# Patient Record
Sex: MALE | Race: WHITE
[De-identification: names, ages, dates, MRNs, and addresses within clinical notes are randomized per-mention and may not be internally consistent; named-entity substitution may affect disease eponyms.]

---
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
## 2019-01-29 ENCOUNTER — HOSPITAL ENCOUNTER (INPATIENT)
Dept: HOSPITAL 62 - ER | Age: 19
LOS: 3 days | Discharge: ANOTHER HOSPITAL | DRG: 871 | End: 2019-02-01
Attending: INTERNAL MEDICINE | Admitting: INTERNAL MEDICINE
Payer: MEDICAID

## 2019-01-29 DIAGNOSIS — F32.9: ICD-10-CM

## 2019-01-29 DIAGNOSIS — B95.8: ICD-10-CM

## 2019-01-29 DIAGNOSIS — A41.9: Primary | ICD-10-CM

## 2019-01-29 DIAGNOSIS — E87.1: ICD-10-CM

## 2019-01-29 DIAGNOSIS — I26.90: ICD-10-CM

## 2019-01-29 DIAGNOSIS — F15.10: ICD-10-CM

## 2019-01-29 DIAGNOSIS — I33.0: ICD-10-CM

## 2019-01-29 DIAGNOSIS — F11.10: ICD-10-CM

## 2019-01-29 DIAGNOSIS — R06.82: ICD-10-CM

## 2019-01-29 DIAGNOSIS — J18.9: ICD-10-CM

## 2019-01-29 DIAGNOSIS — E83.51: ICD-10-CM

## 2019-01-29 DIAGNOSIS — N17.9: ICD-10-CM

## 2019-01-29 LAB
ADD MANUAL DIFF: NO
ALBUMIN SERPL-MCNC: 3.2 G/DL (ref 3.5–5)
ALP SERPL-CCNC: 114 U/L (ref 38–126)
ALT SERPL-CCNC: 15 U/L (ref 21–72)
ANION GAP SERPL CALC-SCNC: 16 MMOL/L (ref 5–19)
APPEARANCE UR: (no result)
APTT PPP: (no result) S
AST SERPL-CCNC: 32 U/L (ref 17–59)
BARBITURATES UR QL SCN: NEGATIVE
BASE EXCESS BLDV CALC-SCNC: 3.9 MMOL/L
BASOPHILS # BLD AUTO: 0.1 10^3/UL (ref 0–0.2)
BASOPHILS NFR BLD AUTO: 0.5 % (ref 0–2)
BILIRUB DIRECT SERPL-MCNC: 0.5 MG/DL (ref 0–0.4)
BILIRUB SERPL-MCNC: 0.7 MG/DL (ref 0.2–1.3)
BILIRUB UR QL STRIP: NEGATIVE
BUN SERPL-MCNC: 57 MG/DL (ref 7–20)
CALCIUM: 8.3 MG/DL (ref 8.4–10.2)
CHLORIDE SERPL-SCNC: 86 MMOL/L (ref 98–107)
CK MB SERPL-MCNC: 1.66 NG/ML (ref ?–4.55)
CO2 SERPL-SCNC: 29 MMOL/L (ref 22–30)
EOSINOPHIL # BLD AUTO: 0 10^3/UL (ref 0–0.6)
EOSINOPHIL NFR BLD AUTO: 0.3 % (ref 0–6)
ERYTHROCYTE [DISTWIDTH] IN BLOOD BY AUTOMATED COUNT: 15.8 % (ref 11.5–14)
GLUCOSE SERPL-MCNC: 134 MG/DL (ref 75–110)
GLUCOSE UR STRIP-MCNC: NEGATIVE MG/DL
HCO3 BLDV-SCNC: 28.7 MMOL/L (ref 20–32)
HCT VFR BLD CALC: 37.1 % (ref 37.9–51)
HGB BLD-MCNC: 12.8 G/DL (ref 13.5–17)
INR PPP: 1.07
KETONES UR STRIP-MCNC: NEGATIVE MG/DL
LYMPHOCYTES # BLD AUTO: 1.1 10^3/UL (ref 0.5–4.7)
LYMPHOCYTES NFR BLD AUTO: 6.8 % (ref 13–45)
MCH RBC QN AUTO: 29.6 PG (ref 27–33.4)
MCHC RBC AUTO-ENTMCNC: 34.5 G/DL (ref 32–36)
MCV RBC AUTO: 86 FL (ref 80–97)
METHADONE UR QL SCN: NEGATIVE
MONOCYTES # BLD AUTO: 0.8 10^3/UL (ref 0.1–1.4)
MONOCYTES NFR BLD AUTO: 5.2 % (ref 3–13)
NEUTROPHILS # BLD AUTO: 13.9 10^3/UL (ref 1.7–8.2)
NEUTS SEG NFR BLD AUTO: 87.2 % (ref 42–78)
NITRITE UR QL STRIP: NEGATIVE
PCO2 BLDV: 44.3 MMHG (ref 35–63)
PCP UR QL SCN: NEGATIVE
PH BLDV: 7.43 [PH] (ref 7.3–7.42)
PH UR STRIP: 5 [PH] (ref 5–9)
PLATELET # BLD: 168 10^3/UL (ref 150–450)
POTASSIUM SERPL-SCNC: 3.9 MMOL/L (ref 3.6–5)
PROT SERPL-MCNC: 6.8 G/DL (ref 6.3–8.2)
PROT UR STRIP-MCNC: 30 MG/DL
PROTHROMBIN TIME: 14.5 SEC (ref 11.4–15.4)
RBC # BLD AUTO: 4.33 10^6/UL (ref 4.35–5.55)
SODIUM SERPL-SCNC: 131.1 MMOL/L (ref 137–145)
SP GR UR STRIP: 1.02
TOTAL CELLS COUNTED % (AUTO): 100 %
TROPONIN I SERPL-MCNC: < 0.012 NG/ML
URINE BENZODIAZEPINES SCREEN: NEGATIVE
URINE COCAINE SCREEN: NEGATIVE
URINE MARIJUANA (THC) SCREEN: NEGATIVE
UROBILINOGEN UR-MCNC: 4 MG/DL (ref ?–2)
WBC # BLD AUTO: 15.9 10^3/UL (ref 4–10.5)

## 2019-01-29 PROCEDURE — 93005 ELECTROCARDIOGRAM TRACING: CPT

## 2019-01-29 PROCEDURE — 71045 X-RAY EXAM CHEST 1 VIEW: CPT

## 2019-01-29 PROCEDURE — 83605 ASSAY OF LACTIC ACID: CPT

## 2019-01-29 PROCEDURE — 86701 HIV-1ANTIBODY: CPT

## 2019-01-29 PROCEDURE — 96365 THER/PROPH/DIAG IV INF INIT: CPT

## 2019-01-29 PROCEDURE — C1751 CATH, INF, PER/CENT/MIDLINE: HCPCS

## 2019-01-29 PROCEDURE — 84484 ASSAY OF TROPONIN QUANT: CPT

## 2019-01-29 PROCEDURE — 87116 MYCOBACTERIA CULTURE: CPT

## 2019-01-29 PROCEDURE — 80074 ACUTE HEPATITIS PANEL: CPT

## 2019-01-29 PROCEDURE — 87206 SMEAR FLUORESCENT/ACID STAI: CPT

## 2019-01-29 PROCEDURE — 82565 ASSAY OF CREATININE: CPT

## 2019-01-29 PROCEDURE — 94640 AIRWAY INHALATION TREATMENT: CPT

## 2019-01-29 PROCEDURE — 85025 COMPLETE CBC W/AUTO DIFF WBC: CPT

## 2019-01-29 PROCEDURE — 93010 ELECTROCARDIOGRAM REPORT: CPT

## 2019-01-29 PROCEDURE — 85610 PROTHROMBIN TIME: CPT

## 2019-01-29 PROCEDURE — 87086 URINE CULTURE/COLONY COUNT: CPT

## 2019-01-29 PROCEDURE — 99291 CRITICAL CARE FIRST HOUR: CPT

## 2019-01-29 PROCEDURE — 87186 SC STD MICRODIL/AGAR DIL: CPT

## 2019-01-29 PROCEDURE — 82553 CREATINE MB FRACTION: CPT

## 2019-01-29 PROCEDURE — 80048 BASIC METABOLIC PNL TOTAL CA: CPT

## 2019-01-29 PROCEDURE — 93306 TTE W/DOPPLER COMPLETE: CPT

## 2019-01-29 PROCEDURE — 82803 BLOOD GASES ANY COMBINATION: CPT

## 2019-01-29 PROCEDURE — 96375 TX/PRO/DX INJ NEW DRUG ADDON: CPT

## 2019-01-29 PROCEDURE — 80307 DRUG TEST PRSMV CHEM ANLYZR: CPT

## 2019-01-29 PROCEDURE — 71047 X-RAY EXAM CHEST 3 VIEWS: CPT

## 2019-01-29 PROCEDURE — 82040 ASSAY OF SERUM ALBUMIN: CPT

## 2019-01-29 PROCEDURE — 71275 CT ANGIOGRAPHY CHEST: CPT

## 2019-01-29 PROCEDURE — 36415 COLL VENOUS BLD VENIPUNCTURE: CPT

## 2019-01-29 PROCEDURE — 87015 SPECIMEN INFECT AGNT CONCNTJ: CPT

## 2019-01-29 PROCEDURE — 36600 WITHDRAWAL OF ARTERIAL BLOOD: CPT

## 2019-01-29 PROCEDURE — 87040 BLOOD CULTURE FOR BACTERIA: CPT

## 2019-01-29 PROCEDURE — 80202 ASSAY OF VANCOMYCIN: CPT

## 2019-01-29 PROCEDURE — 94799 UNLISTED PULMONARY SVC/PX: CPT

## 2019-01-29 PROCEDURE — 80053 COMPREHEN METABOLIC PANEL: CPT

## 2019-01-29 PROCEDURE — 96361 HYDRATE IV INFUSION ADD-ON: CPT

## 2019-01-29 PROCEDURE — 82550 ASSAY OF CK (CPK): CPT

## 2019-01-29 PROCEDURE — 87077 CULTURE AEROBIC IDENTIFY: CPT

## 2019-01-29 PROCEDURE — 02HV33Z INSERTION OF INFUSION DEVICE INTO SUPERIOR VENA CAVA, PERCUTANEOUS APPROACH: ICD-10-PCS

## 2019-01-29 PROCEDURE — 81001 URINALYSIS AUTO W/SCOPE: CPT

## 2019-01-29 PROCEDURE — 83735 ASSAY OF MAGNESIUM: CPT

## 2019-01-29 RX ADMIN — HEPARIN SODIUM SCH UNIT: 5000 INJECTION, SOLUTION INTRAVENOUS; SUBCUTANEOUS at 21:37

## 2019-01-29 RX ADMIN — CEFEPIME HYDROCHLORIDE SCH MLS/HR: 2 INJECTION, SOLUTION INTRAVENOUS at 21:36

## 2019-01-29 RX ADMIN — SODIUM CHLORIDE PRN MLS/HR: 9 INJECTION, SOLUTION INTRAVENOUS at 21:39

## 2019-01-29 RX ADMIN — IPRATROPIUM BROMIDE AND ALBUTEROL SULFATE SCH: 2.5; .5 SOLUTION RESPIRATORY (INHALATION) at 21:00

## 2019-01-29 RX ADMIN — Medication SCH ML: at 21:36

## 2019-01-29 NOTE — ER DOCUMENT REPORT
ED Medical Screen (RME)





- General


Chief Complaint: Painful Cough


Stated Complaint: COUGH,CONGESTION


Time Seen by Provider: 01/29/19 16:25


Mode of Arrival: Wheelchair


Information source: Patient


TRAVEL OUTSIDE OF THE [place] IN LAST 30 DAYS: No





- HPI


Patient complains to provider of: Fever, cough, congestion


Notes: 





01/29/19 16:26


Patient is a 18-year-old male presenting to the emergency room for 2-week 

history of cough which is productive of blood, fever, generalized weakness and 

shortness of breath, he admits to daily IV heroin abuse having used 

approximately 5 hours prior to coming to the ED, he was also recently 

incarcerated and was released approximately 14 days ago, states he was sick 

while he was in longterm but recalls no medical treatment during his time there





- Related Data


Allergies/Adverse Reactions: 


                                        





No Known Allergies Allergy (Verified 01/08/17 18:49)


   











Physical Exam





- Vital signs


Vitals: 





                                        











Temp Pulse Resp BP Pulse Ox


 


 102.2 F H  147 H  28 H  84/49 L  98 


 


 01/29/19 16:19  01/29/19 16:19  01/29/19 16:19  01/29/19 16:19  01/29/19 16:19














Course





- Vital Signs


Vital signs: 





                                        











Temp Pulse Resp BP Pulse Ox


 


 102.2 F H  147 H  28 H  84/49 L  98 


 


 01/29/19 16:19  01/29/19 16:19  01/29/19 16:19  01/29/19 16:19  01/29/19 16:19

## 2019-01-29 NOTE — RADIOLOGY REPORT (SQ)
EXAM DESCRIPTION:  CHEST SINGLE VIEW



COMPLETED DATE/TIME:  1/29/2019 5:52 pm



REASON FOR STUDY:  COUGH



COMPARISON:  1/8/2017



EXAM PARAMETERS:  NUMBER OF VIEWS: One view.

TECHNIQUE: Single frontal radiographic view of the chest acquired.

RADIATION DOSE: NA

LIMITATIONS: None.



FINDINGS:  LUNGS AND PLEURA: Extensive pleural and parenchymal opacities on the right with a right la
teral effusion.  Extensive parenchymal opacities on the left.  No pneumothorax.

MEDIASTINUM AND HILAR STRUCTURES: No masses.  Contour normal.

HEART AND VASCULAR STRUCTURES: Heart normal in size.  Normal vasculature.

BONES: No acute findings.

HARDWARE: None in the chest.

OTHER: No other significant finding.



IMPRESSION:  Extensive pneumonia and right pleural effusion.



TECHNICAL DOCUMENTATION:  JOB ID:  [id number]

 2011 [employer]- All Rights Reserved



Reading location - IP/workstation name: [email or web address]

## 2019-01-29 NOTE — ER DOCUMENT REPORT
ED Respiratory Problem





- General


Chief Complaint: Painful Cough


Stated Complaint: COUGH,CONGESTION


Time Seen by Provider: 01/29/19 16:25


Mode of Arrival: Wheelchair


Notes: 





This is a 18-year-old male to the emergency department chief complaint of 

shortness of breath cough chest pain.  Patient reportedly incarcerated recently 

for approximately 11 days.  IV drug abuser.  Most recent injection was 

approximately 7 hours ago with heroin.  Complaining of shortness of breath and 

chest pain.  No recent trauma.  Increasing cough today.


TRAVEL OUTSIDE OF THE [place] IN LAST 30 DAYS: No





- HPI


Patient complains to provider of: Chest pain, Cough, Short of breath


Onset: Last week


Duration: Continuous, Worse/persistent


Severity: Severe


Pain Level: 4


Context: Smoker


Short of Breath: Moderate


Chest pain/discomfort: Center, Constant


Cough: Nonproductive


Associated symptoms: Fever





- Related Data


Allergies/Adverse Reactions: 


                                        





No Known Allergies Allergy (Verified 01/08/17 18:49)


   











Past Medical History





- General


Information source: Patient





- Social History


Smoking Status: Unknown if Ever Smoked


Chew tobacco use (# tins/day): No


Frequency of alcohol use: None


Drug Abuse: None


Family History: None


Patient has suicidal ideation: No


Patient has homicidal ideation: No


Renal/ Medical History: Denies: Hx Peritoneal Dialysis





Review of Systems





- Review of Systems


Constitutional: Fever.  denies: Malaise, Weakness


EENT: denies: Ear pain, Throat pain, Difficulty swallowing


Cardiovascular: Chest pain, Palpitations, Heart racing


Respiratory: Cough, Short of breath, Wheezing


Gastrointestinal: denies: Abdominal pain, Nausea, Vomiting


Genitourinary: denies: Flank pain, Hematuria, Incontinence


Musculoskeletal: denies: Back pain, Joint pain, Muscle pain


Skin: denies: Dryness, Lesions, Rash


Hematologic/Lymphatic: denies: Blood clots, Easy bleeding, Easy bruising


Neurological/Psychological: denies: Confusion, Weakness, Numbness





Physical Exam





- Vital signs


Vitals: 


                                        











Temp Pulse Resp BP Pulse Ox


 


 102.2 F H  147 H  28 H  84/49 L  98 


 


 01/29/19 16:19  01/29/19 16:19  01/29/19 16:19  01/29/19 16:19  01/29/19 16:19











Interpretation: Hypotensive, Tachycardic, Tachypneic, Febrile





- General


General appearance: Alert.  No: Appears well - Ill-appearing


In distress: Moderate





- HEENT


Head: Normocephalic, Atraumatic


Eyes: Normal


Pupils: PERRL





- Respiratory


Respiratory status: Tachypnea


Chest status: Nontender


Breath sounds: Wheezing


Chest palpation: Normal





- Cardiovascular


Rhythm: Tachycardia


Heart sounds: Normal auscultation


Murmur: No





- Abdominal


Inspection: Normal


Distension: No distension


Bowel sounds: Normal


Tenderness: Nontender


Organomegaly: No organomegaly





- Back


Back: Normal, Nontender





- Extremities


General upper extremity: Normal inspection, Nontender, Normal color, Normal ROM,

Normal temperature


General lower extremity: Normal inspection, Nontender, Normal color, Normal ROM,

Normal temperature, Normal weight bearing.  No: Michell's sign





- Neurological


Neuro grossly intact: Yes


Cognition: Normal


Orientation: AAOx4


[place] Coma Scale Eye Opening: Spontaneous


[place]andra Coma Scale Verbal: Oriented


Casandra Coma Scale Motor: Obeys Commands


Casandra Coma Scale Total: 15


Speech: Normal


Motor strength normal: LUE, RUE, LLE, RLE


Sensory: Normal





- Psychological


Associated symptoms: Normal affect, Normal mood





- Skin


Skin Temperature: Warm


Skin Moisture: Dry


Skin Color: Normal





Course





- Re-evaluation


Re-evalutation: 





01/29/19 17:48


Relatively ill-appearing 18-year-old male, history of substance abuse and IV 

drug abuse.  Tachycardic, hypotensive and tachypneic.  Will start him on 

breathing treatments, oxygen.  Will treat empirically for possible pneumonia.  W

aiting on chest x-ray at this time.  Cultures taken.  Lactic taken.





- Vital Signs


Vital signs: 


                                        











Temp Pulse Resp BP Pulse Ox


 


 98.8 F   147 H  26 H  87/42 L  98 


 


 01/29/19 18:54  01/29/19 16:19  01/29/19 19:30  01/29/19 19:30  01/29/19 19:30














- Laboratory


Result Diagrams: 


                                 01/29/19 16:35





                                 01/29/19 16:35


Laboratory results interpreted by me: 


                                        











  01/29/19 01/29/19 01/29/19





  16:35 16:35 16:35


 


WBC  15.9 H  


 


RBC  4.33 L  


 


Hgb  12.8 L  


 


Hct  37.1 L  


 


RDW  15.8 H  


 


Seg Neutrophils %  87.2 H  


 


Lymphocytes %  6.8 L  


 


Absolute Neutrophils  13.9 H  


 


VBG pH   


 


Sodium   131.1 L 


 


Chloride   86 L 


 


BUN   57 H 


 


Creatinine   2.71 H 


 


Est GFR ( Amer)   36 L 


 


Est GFR (Non-Af Amer)   30 L 


 


Glucose   134 H 


 


Lactic Acid    2.4 H


 


Calcium   8.3 L 


 


Direct Bilirubin   0.5 H 


 


ALT   15 L 


 


Albumin   3.2 L 


 


Urine Protein   


 


Urine Blood   


 


Urine Urobilinogen   


 


Ur Leukocyte Esterase   














  01/29/19 01/29/19





  16:35 17:55


 


WBC  


 


RBC  


 


Hgb  


 


Hct  


 


RDW  


 


Seg Neutrophils %  


 


Lymphocytes %  


 


Absolute Neutrophils  


 


VBG pH  7.43 H 


 


Sodium  


 


Chloride  


 


BUN  


 


Creatinine  


 


Est GFR ( Amer)  


 


Est GFR (Non-Af Amer)  


 


Glucose  


 


Lactic Acid  


 


Calcium  


 


Direct Bilirubin  


 


ALT  


 


Albumin  


 


Urine Protein   30 H


 


Urine Blood   MODERATE H


 


Urine Urobilinogen   4.0 H


 


Ur Leukocyte Esterase   TRACE H














- EKG Interpretation by Me


EKG shows normal: Axis, Intervals, QRS Complexes, ST-T Waves


Rate: Tachycardia





Procedures





- Central Line


  ** Right Internal jugular


Consent obtained: Yes


Central line pre-insertion: Sterile PPE donned, Betadine prep applied, 

Chloraprep applied, Sterile drapes applied


Central line lumen type: Triple


Anesthetic type: 1% Lidocaine


mL's of anesthesia: 1


Ultrasound guided: Yes


Line secured with sutures: Yes


Central line post-insertion: Blood return from lumens, Biopatch applied, 

Sutured, Sterile dressing applied, Position confirmed w/ CXR


Number of attempts: 1


Complications: No





Critical Care Note





- Critical Care Note


Total time excluding time spent on procedures (mins): 60


Comments: 





Hypotension, sepsis, tachycardia





Discharge





- Discharge


Clinical Impression: 


Sepsis


Qualifiers:


 Sepsis type: sepsis due to unspecified organism Qualified Code(s): A41.9 - 

Sepsis, unspecified organism





Pneumonia


Qualifiers:


 Pneumonia type: due to unspecified organism Laterality: bilateral Lung 

location: unspecified part of lung Qualified Code(s): J18.9 - Pneumonia, 

unspecified organism





Condition: Fair


Disposition: ADMITTED AS INPATIENT


Admitting Provider: Hospitalist [facility]


Unit Admitted: ICU

## 2019-01-29 NOTE — RADIOLOGY REPORT (SQ)
XR CHEST 3 VIEWS



HISTORY: Right-sided pleural effusion.



COMPARISON: Radiographs from earlier the same day.



FINDINGS:



There is a new right IJ line with the tip near the cavoatrial

junction. No discernible pneumothorax. There is unchanged

appearance of bilateral airspace opacities and small right

pleural effusion. The heart size is normal. No acute osseous

findings are seen.



IMPRESSION:



1.  New right IJ line with tip near cavoatrial junction. No

pneumothorax.

2.  Unchanged small right pleural effusion and bilateral airspace

opacities.

## 2019-01-30 LAB
ADD MANUAL DIFF: NO
ANION GAP SERPL CALC-SCNC: 10 MMOL/L (ref 5–19)
BASOPHILS # BLD AUTO: 0 10^3/UL (ref 0–0.2)
BASOPHILS NFR BLD AUTO: 0.2 % (ref 0–2)
BUN SERPL-MCNC: 42 MG/DL (ref 7–20)
CALCIUM: 6.9 MG/DL (ref 8.4–10.2)
CHLORIDE SERPL-SCNC: 104 MMOL/L (ref 98–107)
CO2 SERPL-SCNC: 20 MMOL/L (ref 22–30)
EOSINOPHIL # BLD AUTO: 0 10^3/UL (ref 0–0.6)
EOSINOPHIL NFR BLD AUTO: 0.3 % (ref 0–6)
ERYTHROCYTE [DISTWIDTH] IN BLOOD BY AUTOMATED COUNT: 15.6 % (ref 11.5–14)
GLUCOSE SERPL-MCNC: 123 MG/DL (ref 75–110)
HCT VFR BLD CALC: 30.3 % (ref 37.9–51)
HGB BLD-MCNC: 10.3 G/DL (ref 13.5–17)
LYMPHOCYTES # BLD AUTO: 1.7 10^3/UL (ref 0.5–4.7)
LYMPHOCYTES NFR BLD AUTO: 12.4 % (ref 13–45)
MCH RBC QN AUTO: 29.5 PG (ref 27–33.4)
MCHC RBC AUTO-ENTMCNC: 34 G/DL (ref 32–36)
MCV RBC AUTO: 87 FL (ref 80–97)
MONOCYTES # BLD AUTO: 0.6 10^3/UL (ref 0.1–1.4)
MONOCYTES NFR BLD AUTO: 4.3 % (ref 3–13)
NEUTROPHILS # BLD AUTO: 11.3 10^3/UL (ref 1.7–8.2)
NEUTS SEG NFR BLD AUTO: 82.8 % (ref 42–78)
PLATELET # BLD: 108 10^3/UL (ref 150–450)
POTASSIUM SERPL-SCNC: 3.8 MMOL/L (ref 3.6–5)
RBC # BLD AUTO: 3.5 10^6/UL (ref 4.35–5.55)
SODIUM SERPL-SCNC: 134.2 MMOL/L (ref 137–145)
TOTAL CELLS COUNTED % (AUTO): 100 %
WBC # BLD AUTO: 13.6 10^3/UL (ref 4–10.5)

## 2019-01-30 PROCEDURE — 3E0F3GC INTRODUCTION OF OTHER THERAPEUTIC SUBSTANCE INTO RESPIRATORY TRACT, PERCUTANEOUS APPROACH: ICD-10-PCS | Performed by: INTERNAL MEDICINE

## 2019-01-30 RX ADMIN — VANCOMYCIN HYDROCHLORIDE SCH MLS/HR: 1 INJECTION, POWDER, LYOPHILIZED, FOR SOLUTION INTRAVENOUS at 21:33

## 2019-01-30 RX ADMIN — Medication SCH: at 06:40

## 2019-01-30 RX ADMIN — Medication SCH ML: at 13:50

## 2019-01-30 RX ADMIN — FOLIC ACID SCH MLS/HR: 5 INJECTION, SOLUTION INTRAMUSCULAR; INTRAVENOUS; SUBCUTANEOUS at 17:32

## 2019-01-30 RX ADMIN — IPRATROPIUM BROMIDE AND ALBUTEROL SULFATE SCH ML: 2.5; .5 SOLUTION RESPIRATORY (INHALATION) at 13:41

## 2019-01-30 RX ADMIN — CEFEPIME HYDROCHLORIDE SCH MLS/HR: 2 INJECTION, SOLUTION INTRAVENOUS at 09:00

## 2019-01-30 RX ADMIN — SODIUM CHLORIDE PRN MLS/HR: 9 INJECTION, SOLUTION INTRAVENOUS at 00:30

## 2019-01-30 RX ADMIN — Medication SCH ML: at 06:39

## 2019-01-30 RX ADMIN — Medication SCH ML: at 21:13

## 2019-01-30 RX ADMIN — HEPARIN SODIUM SCH UNIT: 5000 INJECTION, SOLUTION INTRAVENOUS; SUBCUTANEOUS at 21:11

## 2019-01-30 RX ADMIN — LEVALBUTEROL HYDROCHLORIDE SCH MG: 1.25 SOLUTION RESPIRATORY (INHALATION) at 20:47

## 2019-01-30 RX ADMIN — IPRATROPIUM BROMIDE AND ALBUTEROL SULFATE SCH ML: 2.5; .5 SOLUTION RESPIRATORY (INHALATION) at 19:26

## 2019-01-30 RX ADMIN — CEFEPIME HYDROCHLORIDE SCH MLS/HR: 2 INJECTION, SOLUTION INTRAVENOUS at 21:12

## 2019-01-30 RX ADMIN — IPRATROPIUM BROMIDE AND ALBUTEROL SULFATE SCH ML: 2.5; .5 SOLUTION RESPIRATORY (INHALATION) at 08:47

## 2019-01-30 RX ADMIN — ACETAMINOPHEN PRN MG: 325 TABLET ORAL at 19:47

## 2019-01-30 RX ADMIN — HEPARIN SODIUM SCH UNIT: 5000 INJECTION, SOLUTION INTRAVENOUS; SUBCUTANEOUS at 13:50

## 2019-01-30 RX ADMIN — VANCOMYCIN HYDROCHLORIDE SCH MLS/HR: 1 INJECTION, POWDER, LYOPHILIZED, FOR SOLUTION INTRAVENOUS at 10:26

## 2019-01-30 RX ADMIN — ACETAMINOPHEN PRN MG: 325 TABLET ORAL at 06:38

## 2019-01-30 RX ADMIN — QUETIAPINE FUMARATE SCH MG: 100 TABLET, FILM COATED ORAL at 21:12

## 2019-01-30 RX ADMIN — SODIUM CHLORIDE PRN MLS/HR: 9 INJECTION, SOLUTION INTRAVENOUS at 02:47

## 2019-01-30 RX ADMIN — IPRATROPIUM BROMIDE AND ALBUTEROL SULFATE SCH: 2.5; .5 SOLUTION RESPIRATORY (INHALATION) at 19:30

## 2019-01-30 RX ADMIN — LORAZEPAM PRN MG: 2 INJECTION INTRAMUSCULAR; INTRAVENOUS at 21:12

## 2019-01-30 RX ADMIN — ACETAMINOPHEN PRN MG: 325 TABLET ORAL at 00:10

## 2019-01-30 RX ADMIN — IPRATROPIUM BROMIDE AND ALBUTEROL SULFATE SCH ML: 2.5; .5 SOLUTION RESPIRATORY (INHALATION) at 02:14

## 2019-01-30 RX ADMIN — HEPARIN SODIUM SCH UNIT: 5000 INJECTION, SOLUTION INTRAVENOUS; SUBCUTANEOUS at 06:38

## 2019-01-30 NOTE — PROGRESS NOTE
Provider Note


Provider Note: 


ID Consult Note





Asked to review patient's chart by Pharmacy. Pt not seen or examined.  





Mr Cuevas is a 18 year old man with IV drug use who was admitted to [place] today 

1/30/19 with a 2 week history of nonproductive cough associated with pleuritic 

chest pain, fever, SOB and genearlized weakness. In the ED he was appreciated to

be tachycardic, hypotensive, tachypneic, and febrile to 102 F. On exam, he was 

noted to be thin, disheveled, in distress, with accessory muscle use and rhonchi

on lung exam, systolic murmur, track marks on RUE without open ulcers, erythema 

or exudates. Labs included WBC 15.9, SCr 2.7, elevated BUN, low chloride, low 

sodium, elevated lactic acid. Blood cultures show growth of GPCs from two 

separate sites at two different times. He has a single view CXR that was read as

showing pulmonary parenchymal opacities and a right lateral effusion. TTE was 

ordered. 





Impression/Recommendations


Septic shock, bacteremia with Gram positive cocci, possible endocarditis with 

septic pulmonary emboli


- Pt appears to have a high grade bacteremia, as GPCs are preliminarily reported

from both sets of blood cultures, described as being in clusters in one set, 

which suggests a Staph species. In combination with the fever, systolic murmur, 

and active IVDU, the presentation is concerning for endocarditis and septic 

emboli, potentially as the reason for the CXR infiltrate. 


- Currently, awaiting TTE report


- Consider CT scan of chest for better definition of pulmonary lesions; if b/l 

peripherally distributed nodules this would fit the appearance of septic 

pulmonary emboli in this setting


- Until GPCs are identified, agree, vancomycin empirically


- Considering the preliminary nature of blood culture results, continuing 

cefepime for now is reasonable, but if no GNRs are identified in a day or two, 

it should be discontinued


- Suggest repeating blood cultures in about 48h





Gonzalo Blackwell MD


[facility] Infectious Diseases


pager [phone]

## 2019-01-30 NOTE — PDOC PROGRESS REPORT
Subjective


Progress Note for:: 01/30/19


Subjective:: 


MARCELLA BUCHANAN is a 18 year old male with a past medical history of depression, 

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 

with shortness of breath and cough.  





He is found to have hypotension, tachypnea, bilateral pneumonia, acute renal 

failure and hyponatremia.  Was started on IV saline, empiric antibiotics and 

referred to the hospitalist for admission.  





On admission he complained of sharp right-sided chest pain with deep inhalation 

and coughing which is nonproductive.  Patient appears chronically ill and pale, 

admits his last use 7 hours prior to presentation and has needle tracking 

without open ulcer or phlebitis on his right arm.  Patient is unaware of HIV, 

hepatitis status. Patient formerly prescribed Seroquel for depression.





Patient is still complaining of shortness of breath, generalized weakness, 

right-sided pleuritic chest pain. 





He has been normotensive but tachycardic and tachypneic saturating 100% on room 

air.  Prelim blood cultures positive for gram-positive cocci in clusters, 

pending sensitivity.  Currently he is on vancomycin and cefepime day 2.





White blood cells 13.6 down from 15.9, lactic acid 6.  Lactic acid 0.6, calcium 

6.9. 











Reason For Visit: 


SEPSIS, IV DRUGS C PNEUMONIA








Physical Exam


Vital Signs: 


                                        











Temp Pulse Resp BP Pulse Ox


 


 98.5 F   108 H  25 H  111/79   100 


 


 01/30/19 16:00  01/30/19 16:00  01/30/19 16:00  01/30/19 16:00  01/30/19 16:00








                                 Intake & Output











 01/29/19 01/30/19 01/31/19





 06:59 06:59 06:59


 


Intake Total  3000 4350


 


Output Total  800 850


 


Balance  2200 3500


 


Weight  50.9 kg 











General appearance: PRESENT: mild distress


Head exam: PRESENT: atraumatic, normocephalic


Respiratory exam: PRESENT: clear to auscultation tomasa.  ABSENT: rales, rhonchi, 

wheezes


Cardiovascular exam: PRESENT: RRR, tachycardia.  ABSENT: diastolic murmur, rubs,

systolic murmur


GI/Abdominal exam: PRESENT: normal bowel sounds, soft.  ABSENT: distended, gua

rding, mass, organolmegaly, rebound, tenderness


Neurological exam: PRESENT: alert, awake, oriented to person, oriented to place,

oriented to time, oriented to situation, CN II-XII grossly intact.  ABSENT: 

motor sensory deficit


Skin exam: PRESENT: dry, intact, warm.  ABSENT: cyanosis, rash





Results


Laboratory Results: 


                                        





                                 01/30/19 06:33 





                                 01/30/19 06:33 





                                        











  01/29/19 01/29/19 01/30/19





  17:55 21:30 06:33


 


WBC   


 


RBC   


 


Hgb   


 


Hct   


 


MCV   


 


MCH   


 


MCHC   


 


RDW   


 


Plt Count   


 


Seg Neutrophils %   


 


Lymphocytes %   


 


Monocytes %   


 


Eosinophils %   


 


Basophils %   


 


Absolute Neutrophils   


 


Absolute Lymphocytes   


 


Absolute Monocytes   


 


Absolute Eosinophils   


 


Absolute Basophils   


 


Sodium    134.2 L


 


Potassium    3.8


 


Chloride    104


 


Carbon Dioxide    20 L


 


Anion Gap    10


 


BUN    42 H


 


Creatinine    1.18


 


Est GFR ( Amer)    > 60


 


Est GFR (Non-Af Amer)    > 60


 


Glucose    123 H


 


Lactic Acid   0.6 L 


 


Calcium    6.9 L*


 


Albumin   


 


Urine Color  NACHO  


 


Urine Appearance  CLOUDY  


 


Urine pH  5.0  


 


Ur Specific Gravity  1.018  


 


Urine Protein  30 H  


 


Urine Glucose (UA)  NEGATIVE  


 


Urine Ketones  NEGATIVE  


 


Urine Blood  MODERATE H  


 


Urine Nitrite  NEGATIVE  


 


Ur Leukocyte Esterase  TRACE H  


 


Urine WBC (Auto)  33  


 


Urine RBC (Auto)  22  














  01/30/19 01/30/19





  06:33 06:33


 


WBC  13.6 H 


 


RBC  3.50 L 


 


Hgb  10.3 L D 


 


Hct  30.3 L 


 


MCV  87 


 


MCH  29.5 


 


MCHC  34.0 


 


RDW  15.6 H 


 


Plt Count  108 L 


 


Seg Neutrophils %  82.8 H 


 


Lymphocytes %  12.4 L 


 


Monocytes %  4.3 


 


Eosinophils %  0.3 


 


Basophils %  0.2 


 


Absolute Neutrophils  11.3 H 


 


Absolute Lymphocytes  1.7 


 


Absolute Monocytes  0.6 


 


Absolute Eosinophils  0.0 


 


Absolute Basophils  0.0 


 


Sodium  


 


Potassium  


 


Chloride  


 


Carbon Dioxide  


 


Anion Gap  


 


BUN  


 


Creatinine  


 


Est GFR ( Amer)  


 


Est GFR (Non-Af Amer)  


 


Glucose  


 


Lactic Acid  


 


Calcium  


 


Albumin   2.0 L


 


Urine Color  


 


Urine Appearance  


 


Urine pH  


 


Ur Specific Gravity  


 


Urine Protein  


 


Urine Glucose (UA)  


 


Urine Ketones  


 


Urine Blood  


 


Urine Nitrite  


 


Ur Leukocyte Esterase  


 


Urine WBC (Auto)  


 


Urine RBC (Auto)  








                                        











  01/29/19 01/29/19





  16:35 16:35


 


Creatine Kinase  122 


 


CK-MB (CK-2)   1.66


 


Troponin I   < 0.012











Impressions: 


                                        





Apical Lordotic X-Ray  01/29/19 00:00


IMPRESSION:


 


1.  New right IJ line with tip near cavoatrial junction. No


pneumothorax.


2.  Unchanged small right pleural effusion and bilateral airspace


opacities.


 








Chest X-Ray  01/29/19 16:26


IMPRESSION:  Extensive pneumonia and right pleural effusion.


 














Assessment & Plan





- Diagnosis


(1) Sepsis


Qualifiers: 


   Sepsis type: sepsis due to unspecified organism   Qualified Code(s): A41.9 - 

Sepsis, unspecified organism   


Is this a current diagnosis for this admission?: Yes   


Plan: 


Likely due to underlying infectious process.  Continue empiric IV antibiotics.  

Continue IV fluids monitor volume status and vitals.








(2) Gram-positive bacteremia


Is this a current diagnosis for this admission?: Yes   


Plan: 


Likely caused by continuous IV drug abuse. 


Prelim blood cultures 2/2 are growing gram-positive cocci.


Continue IV empiric antibiotics.


Pending 2D echo to rule out endocarditis.


Follow-up blood cultures.








(3) Pneumonia


Qualifiers: 


   Pneumonia type: due to unspecified organism   Laterality: bilateral   Lung 

location: unspecified part of lung   Qualified Code(s): J18.9 - Pneumonia, 

unspecified organism   


Is this a current diagnosis for this admission?: Yes   


Plan: 


Likely complicated by continuous IV drug abuse.  


Continue IV antibiotics.  Follow-up cultures.








(4) Hypocalcemia


Is this a current diagnosis for this admission?: Yes   


Plan: 


Replaced.  CMP tomorrow.








(5) IV drug abuse


Is this a current diagnosis for this admission?: Yes   


Plan: 


Supportive measures.  Monitor for withdrawals.  Will consider starting 

methadone.

## 2019-01-30 NOTE — XCELERA REPORT
[address] [address]

                               Tel: [phone]

                               Fax: [phone]



                      Transthoracic Echocardiogram Report

_______________________________________________________________________________



Name: MARCELLA BUCHANAN

MRN: [id number]                           Age: 18 yrs

Gender: Male                              : 2000

Patient Status: Inpatient                 Patient Location: ICU^611^A

Account #: [id number]

Study Date: 2019 02:53 PM

Accession #: [id number]

_______________________________________________________________________________



Height: 61 in        Weight: 107 lb        BSA: 1.4 m2

_______________________________________________________________________________

Procedure: A two-dimensional transthoracic echocardiogram with color flow and

Doppler was performed. Study Quality: Good.

Reason For Study: iv drug user w murmur



History: IV DRUG ABUSE / MURMUR / ENDOCARDITIS.

Ordering Physician: RAHUL MOORE



Performed By: Casandra Bro

_______________________________________________________________________________



Interpretation Summary

The left ventricle is normal in size.

There is normal left ventricular wall thickness.

LV EF is 60%

The left ventricular ejection fraction is preserved.

Doppler measurements suggest normal left ventricular diastolic function

The left ventricular wall motion is normal.

There is no thrombus.

There is no ventricular septal defect visualized.

The right ventricle is normal in size and function.

The right atrium is normal.

The left atrial size is normal.

The interatrial septum is intact with no evidence for an atrial septal defect.

There is no Doppler evidence for an interatrial shunt

There is no evidence of mitral valve prolapse.

There is no vegetation seen on the mitral valve.

There is no mitral valve stenosis.

There is a trace amount of mitral regurgitation

There is no aortic valve stenosis

There is no LVOT obstruction.

No aortic regurgitation is present.

There is a large vegetation or mass on the tricuspid valve.

tHE VEGETATION IS MOBILE AND MEASURES 6.1 cm IN CIRCUMFERENCE.

There is no tricuspid stenosis.

There is a moderate to severe amount of tricuspid regurgitation

There is mild pulmonary hypertension by echo

RVSP is 43 to 48 mm of Hg , with RA mean of 5 to 10.

There is no pulmonic valvular stenosis.

There is no pulmonic valvular regurgitation.

The aortic root is normal size.

The inferior vena cava appeared normal and decreased > 50% with respiration

(RAP 5-10 mmHg)

There is no pericardial effusion.



MMode/2D Measurements & Calculations

RVDd: 2.4 cm        LVIDd: 4.5 cm      FS: 33.5 %         Ao root diam: 2.7 cm



IVSd: 0.66 cm       LVIDs: 3.0 cm      EDV(Teich):        Ao root area:

                    LVPWd: 0.65 cm     91.3 ml            5.6 cm2

                                       ESV(Teich):        LA dimension: 2.7 cm

                                       34.4 ml

                                       EF(Teich): 62.4 %

        _______________________________________________________________

LVLd ap4: 8.2 cm    SV(MOD-sp4):

EDV(MOD-sp4):       47.0 ml

76.0 ml

LVLs ap4: 6.6 cm

ESV(MOD-sp4):

29.0 ml

EF(MOD-sp4): 61.8 %



Doppler Measurements & Calculations

MV E max rachael:      MV P1/2t max rachael:     Ao V2 max:          LV V1 max P.9 cm/sec        83.4 cm/sec           121.0 cm/sec        2.4 mmHg



MV A max rachael:      MV P1/2t: 64.3 msec   Ao max P.9 mmHg LV V1 max:

66.1 cm/sec        MVA(P1/2t): 3.4 cm2                       77.0 cm/sec

MV E/A: 1.3        MV dec slope:

                   379.8 cm/sec2

                   MV dec time: 0.23 sec



        _______________________________________________________________

PA V2 max:         TR max rachael:           MV P1/2t-pr_phl:

95.8 cm/sec        305.4 cm/sec          64.3 msec

PA max PG:         TR max P.3 mmHg

3.7 mmHg



Left Ventricle

The left ventricle is normal in size. There is normal left ventricular wall

thickness. LV EF is 60%. The left ventricular ejection fraction is preserved.

Doppler measurements suggest normal left ventricular diastolic function. The

left ventricular wall motion is normal. There is no thrombus. There is no

ventricular septal defect visualized.



Right Ventricle

The right ventricle is normal in size and function.





Atria

The right atrium is normal. The left atrial size is normal. The interatrial

septum is intact with no evidence for an atrial septal defect. There is no

Doppler evidence for an interatrial shunt.



Mitral Valve

There is no evidence of mitral valve prolapse. There is no vegetation seen on

the mitral valve. There is no mitral valve stenosis. There is a trace amount

of mitral regurgitation.



Aortic Valve

There is no aortic valve stenosis. There is no LVOT obstruction. No aortic

regurgitation is present.



Tricuspid Valve

There is a large vegetation or mass on the tricuspid valve. tHE VEGETATION IS

MOBILE AND MEASURES 6.1 cm IN CIRCUMFERENCE. There is no tricuspid stenosis.

There is a moderate to severe amount of tricuspid regurgitation. There is mild

pulmonary hypertension by echo. RVSP is 43 to 48 mm of Hg , with RA mean of 5

to 10.





Pulmonic Valve

There is no pulmonic valvular stenosis. There is no pulmonic valvular

regurgitation.



Great Vessels

The aortic root is normal size. The inferior vena cava appeared normal and

decreased > 50% with respiration (RAP 5-10 mmHg).



Effusions

There is no pericardial effusion.





_______________________________________________________________________________

_______________________________________________________________________________

Electronically signed by:      Sarah Rayo      on 2019 10:49 PM



CC: RAHUL MOORE

>

Sarah Rayo

## 2019-01-30 NOTE — EKG REPORT
SEVERITY:- OTHERWISE NORMAL ECG -

SINUS TACHYCARDIA

BORDERLINE RIGHT AXIS DEVIATION

:

Confirmed by: Nilay Rodrigez MD 30-Jan-2019 07:54:15

## 2019-01-30 NOTE — PDOC H&P
History of Present Illness


Admission Date/PCP: 


  01/29/19 19:39





  





Patient complains of: Cough


History of Present Illness: 


MARCELLA BUCHANAN is a 18 year old male with a past medical history of depression, 

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 

with shortness of breath and cough.  He is found to have hypotension, tachypnea,

bilateral pneumonia, acute renal failure and hyponatremia.  He started on IV 

saline, empiric antibiotics and referred to the hospitalist for admission.  He 

complains of sharp right-sided chest pain with deep inhalation and coughing 

which is nonproductive.  Patient appears chronically ill and pale, admits his 

last use 7 hours prior to presentation and has needle tracking without open 

ulcer or phlebitis on his right arm.  Patient is unaware of HIV, hepatitis 

status.


Patient formerly prescribed Seroquel for depression.





Past Medical History


Cardiac Medical History: Reports: None


Pulmonary Medical History: Reports: None


EENT Medical History: Reports: None


Neurological Medical History: Reports: None


Endocrine Medical History: Reports: None


Renal/ Medical History: Reports: None


Malignancy Medical History: Reports: None


GI Medical History: Reports: None


Musculoskeltal Medical History: Reports: None


Skin Medical History: Reports: None


Psychiatric Medical History: Reports: Depression, Substance Abuse





Past Surgical History


Past Surgical History: Reports: None





Social History


Information Source: Patient


Smoking Status: Never Smoker


Frequency of Alcohol Use: None


Hx Recreational Drug Use: Yes


Drugs: Heroin





- Advance Directive


Resuscitation Status: Full Code





Family History


Family History: Hypertension


Parental Family History Reviewed: Yes


Children Family History Reviewed: Yes


Sibling(s) Family History Reviewed.: Yes





Medication/Allergy


Home Medications: 








No Home Medications  01/29/19 








Allergies/Adverse Reactions: 


                                        





No Known Allergies Allergy (Verified 01/08/17 18:49)


   











Review of Systems


Constitutional: PRESENT: as per HPI, fatigue, weakness


Eyes: PRESENT: as per HPI


Ears: PRESENT: as per HPI


Nose, Mouth, and Throat: PRESENT: as per HPI


Cardiovascular: PRESENT: as per HPI, chest pain


Respiratory: PRESENT: as per HPI, cough, dyspnea.  ABSENT: hemoptysis, sputum


Gastrointestinal: ABSENT: abdominal pain, constipation, diarrhea, hematemesis, 

hematochezia, nausea, vomiting


Genitourinary: ABSENT: dysuria, hematuria


Musculoskeletal: ABSENT: joint swelling


Integumentary: ABSENT: rash, wounds


Neurological: ABSENT: abnormal gait, abnormal speech, confusion, dizziness, 

focal weakness, syncope


Psychiatric: ABSENT: anxiety, depression, homidical ideation, suicidal ideation


Endocrine: ABSENT: cold intolerance, heat intolerance, polydipsia, polyuria


Hematologic/Lymphatic: ABSENT: easy bleeding, easy bruising





Physical Exam


Vital Signs: 


                                        











Temp Pulse Resp BP Pulse Ox


 


 98.0 F   100   36 H  89/45 L  97 


 


 01/30/19 00:00  01/30/19 02:15  01/30/19 04:03  01/30/19 04:03  01/30/19 04:03








                                 Intake & Output











 01/28/19 01/29/19 01/30/19





 11:59 11:59 11:59


 


Intake Total   3000


 


Output Total   400


 


Balance   2600


 


Weight   50.9 kg











General appearance: PRESENT: cooperative, disheveled, severe distress, thin


Head exam: PRESENT: atraumatic, normocephalic


Eye exam: PRESENT: conjunctiva pink, EOMI, PERRLA.  ABSENT: scleral icterus


Ear exam: PRESENT: normal external ear exam


Mouth exam: PRESENT: moist, tongue midline


Neck exam: ABSENT: carotid bruit, JVD, lymphadenopathy, thyromegaly


Respiratory exam: PRESENT: accessory muscle use, prolonged expiratory phas, 

retraction, rhonchi, tachypnea


Cardiovascular exam: PRESENT: RRR, systolic murmur, tachycardia.  ABSENT: 

diastolic murmur, rubs


Pulses: PRESENT: normal dorsalis pedis pul


Vascular exam: PRESENT: normal capillary refill


GI/Abdominal exam: PRESENT: normal bowel sounds, soft.  ABSENT: distended, 

guarding, mass, organolmegaly, rebound, tenderness


Rectal exam: PRESENT: deferred


Extremities exam: PRESENT: full ROM, other - Right upper extremity with 

extensive track marking without open ulcer erythema or exudate.  ABSENT: calf 

tenderness, clubbing, pedal edema


Neurological exam: PRESENT: alert, altered, awake, oriented to person, oriented 

to place, oriented to time, oriented to situation, CN II-XII grossly intact.  

ABSENT: motor sensory deficit


Psychiatric exam: PRESENT: appropriate affect, normal mood, unusual affect.  

ABSENT: homicidal ideation, suicidal ideation


Skin exam: PRESENT: dry, intact, warm.  ABSENT: cyanosis, rash





Results


Laboratory Results: 


                                        





                                 01/29/19 16:35 





                                 01/29/19 16:35 





                                        











  01/29/19 01/29/19 01/29/19





  16:35 16:35 16:35


 


WBC  15.9 H  


 


RBC  4.33 L  


 


Hgb  12.8 L  


 


Hct  37.1 L  


 


MCV  86  


 


MCH  29.6  


 


MCHC  34.5  


 


RDW  15.8 H  


 


Plt Count  168  


 


Seg Neutrophils %  87.2 H  


 


Lymphocytes %  6.8 L  


 


Monocytes %  5.2  


 


Eosinophils %  0.3  


 


Basophils %  0.5  


 


Absolute Neutrophils  13.9 H  


 


Absolute Lymphocytes  1.1  


 


Absolute Monocytes  0.8  


 


Absolute Eosinophils  0.0  


 


Absolute Basophils  0.1  


 


VBG pH   


 


VBG pCO2   


 


VBG HCO3   


 


VBG Base Excess   


 


Sodium   131.1 L 


 


Potassium   3.9 


 


Chloride   86 L 


 


Carbon Dioxide   29 


 


Anion Gap   16 


 


BUN   57 H 


 


Creatinine   2.71 H 


 


Est GFR ( Amer)   36 L 


 


Est GFR (Non-Af Amer)   30 L 


 


Glucose   134 H 


 


Lactic Acid    2.4 H


 


Calcium   8.3 L 


 


Total Bilirubin   0.7 


 


AST   32 


 


ALT   15 L 


 


Alkaline Phosphatase   114 


 


Total Protein   6.8 


 


Albumin   3.2 L 


 


Urine Color   


 


Urine Appearance   


 


Urine pH   


 


Ur Specific Gravity   


 


Urine Protein   


 


Urine Glucose (UA)   


 


Urine Ketones   


 


Urine Blood   


 


Urine Nitrite   


 


Ur Leukocyte Esterase   


 


Urine WBC (Auto)   


 


Urine RBC (Auto)   














  01/29/19 01/29/19 01/29/19





  16:35 17:55 21:30


 


WBC   


 


RBC   


 


Hgb   


 


Hct   


 


MCV   


 


MCH   


 


MCHC   


 


RDW   


 


Plt Count   


 


Seg Neutrophils %   


 


Lymphocytes %   


 


Monocytes %   


 


Eosinophils %   


 


Basophils %   


 


Absolute Neutrophils   


 


Absolute Lymphocytes   


 


Absolute Monocytes   


 


Absolute Eosinophils   


 


Absolute Basophils   


 


VBG pH  7.43 H  


 


VBG pCO2  44.3  


 


VBG HCO3  28.7  


 


VBG Base Excess  3.9  


 


Sodium   


 


Potassium   


 


Chloride   


 


Carbon Dioxide   


 


Anion Gap   


 


BUN   


 


Creatinine   


 


Est GFR ( Amer)   


 


Est GFR (Non-Af Amer)   


 


Glucose   


 


Lactic Acid    0.6 L


 


Calcium   


 


Total Bilirubin   


 


AST   


 


ALT   


 


Alkaline Phosphatase   


 


Total Protein   


 


Albumin   


 


Urine Color   NACHO 


 


Urine Appearance   CLOUDY 


 


Urine pH   5.0 


 


Ur Specific Gravity   1.018 


 


Urine Protein   30 H 


 


Urine Glucose (UA)   NEGATIVE 


 


Urine Ketones   NEGATIVE 


 


Urine Blood   MODERATE H 


 


Urine Nitrite   NEGATIVE 


 


Ur Leukocyte Esterase   TRACE H 


 


Urine WBC (Auto)   33 


 


Urine RBC (Auto)   22 








                                        











  01/29/19 01/29/19





  16:35 16:35


 


Creatine Kinase  122 


 


CK-MB (CK-2)   1.66


 


Troponin I   < 0.012











Impressions: 


                                        





Apical Lordotic X-Ray  01/29/19 00:00


IMPRESSION:


 


1.  New right IJ line with tip near cavoatrial junction. No


pneumothorax.


2.  Unchanged small right pleural effusion and bilateral airspace


opacities.


 








Chest X-Ray  01/29/19 16:26


IMPRESSION:  Extensive pneumonia and right pleural effusion.


 














Assessment & Plan





- Diagnosis


(1) Pneumonia


Qualifiers: 


   Pneumonia type: due to unspecified organism   Laterality: bilateral   Lung 

location: unspecified part of lung   Qualified Code(s): J18.9 - Pneumonia, 

unspecified organism   


Is this a current diagnosis for this admission?: Yes   


Plan: 


Complicated by IV drug abuse and concern for septic emboli.  Albuterol, 

Atrovent, incentive spirometry, IV vancomycin and cefepime ordered.  Follow-up 

CBC and blood culture








(2) Sepsis


Qualifiers: 


   Sepsis type: sepsis due to unspecified organism   Qualified Code(s): A41.9 - 

Sepsis, unspecified organism   


Is this a current diagnosis for this admission?: Yes   


Plan: 


Secondary to #1, IV fluid challenge, pressors as needed, consider Narcan








(3) IV drug abuse


Is this a current diagnosis for this admission?: Yes   


Plan: 


Methamphetamine and heroin use, supportive care, consider methadone








(4) Murmur


Is this a current diagnosis for this admission?: Yes   


Plan: 


Follow-up 2D echo and evaluation of possible endocarditis.








- Time


Time Spent: 50 to 70 Minutes





- Inpatient Certification


Medical Necessity: Need Close Monitoring Due to Risk of Patient Decompensation

## 2019-01-31 LAB
ADD MANUAL DIFF: NO
ALBUMIN SERPL-MCNC: 1.8 G/DL (ref 3.5–5)
ALP SERPL-CCNC: 77 U/L (ref 38–126)
ALT SERPL-CCNC: 17 U/L (ref 21–72)
ANION GAP SERPL CALC-SCNC: 7 MMOL/L (ref 5–19)
ARTERIAL BLOOD FIO2: (no result)
ARTERIAL BLOOD FIO2: (no result)
ARTERIAL BLOOD H2CO3: 0.82 MMOL/L (ref 1.05–1.35)
ARTERIAL BLOOD H2CO3: 0.98 MMOL/L (ref 1.05–1.35)
ARTERIAL BLOOD HCO3: 20.1 MMOL/L (ref 20–24)
ARTERIAL BLOOD HCO3: 22.2 MMOL/L (ref 20–24)
ARTERIAL BLOOD PCO2: 27.1 MMHG (ref 35–45)
ARTERIAL BLOOD PCO2: 32.4 MMHG (ref 35–45)
ARTERIAL BLOOD PH: 7.45 (ref 7.35–7.45)
ARTERIAL BLOOD PH: 7.49 (ref 7.35–7.45)
ARTERIAL BLOOD PO2: 111.2 MMHG (ref 80–100)
ARTERIAL BLOOD PO2: 116.3 MMHG (ref 80–100)
ARTERIAL BLOOD TOTAL CO2: 21 MMOL/L (ref 23–27)
ARTERIAL BLOOD TOTAL CO2: 23.2 MMOL/L (ref 23–27)
AST SERPL-CCNC: 17 U/L (ref 17–59)
BASE EXCESS BLDA CALC-SCNC: -1.2 MMOL/L
BASE EXCESS BLDA CALC-SCNC: -2.6 MMOL/L
BASOPHILS # BLD AUTO: 0.1 10^3/UL (ref 0–0.2)
BASOPHILS NFR BLD AUTO: 0.4 % (ref 0–2)
BILIRUB DIRECT SERPL-MCNC: 0.3 MG/DL (ref 0–0.4)
BILIRUB SERPL-MCNC: 0.5 MG/DL (ref 0.2–1.3)
BUN SERPL-MCNC: 16 MG/DL (ref 7–20)
CALCIUM: 7.3 MG/DL (ref 8.4–10.2)
CHLORIDE SERPL-SCNC: 112 MMOL/L (ref 98–107)
CO2 SERPL-SCNC: 20 MMOL/L (ref 22–30)
EOSINOPHIL # BLD AUTO: 0 10^3/UL (ref 0–0.6)
EOSINOPHIL NFR BLD AUTO: 0.3 % (ref 0–6)
ERYTHROCYTE [DISTWIDTH] IN BLOOD BY AUTOMATED COUNT: 15.8 % (ref 11.5–14)
GLUCOSE SERPL-MCNC: 102 MG/DL (ref 75–110)
HCT VFR BLD CALC: 25.3 % (ref 37.9–51)
HGB BLD-MCNC: 8.8 G/DL (ref 13.5–17)
LYMPHOCYTES # BLD AUTO: 2.2 10^3/UL (ref 0.5–4.7)
LYMPHOCYTES NFR BLD AUTO: 14.8 % (ref 13–45)
MCH RBC QN AUTO: 29.9 PG (ref 27–33.4)
MCHC RBC AUTO-ENTMCNC: 34.7 G/DL (ref 32–36)
MCV RBC AUTO: 86 FL (ref 80–97)
MONOCYTES # BLD AUTO: 1.1 10^3/UL (ref 0.1–1.4)
MONOCYTES NFR BLD AUTO: 7.5 % (ref 3–13)
NEUTROPHILS # BLD AUTO: 11.3 10^3/UL (ref 1.7–8.2)
NEUTS SEG NFR BLD AUTO: 77 % (ref 42–78)
PLATELET # BLD: 104 10^3/UL (ref 150–450)
POTASSIUM SERPL-SCNC: 3.1 MMOL/L (ref 3.6–5)
PROT SERPL-MCNC: 4.5 G/DL (ref 6.3–8.2)
RBC # BLD AUTO: 2.94 10^6/UL (ref 4.35–5.55)
SAO2 % BLDA: 98.3 % (ref 94–98)
SAO2 % BLDA: 98.6 % (ref 94–98)
SODIUM SERPL-SCNC: 139 MMOL/L (ref 137–145)
TOTAL CELLS COUNTED % (AUTO): 100 %
VANCOMYCIN,TROUGH: < 5 UG/ML (ref 5–20)
WBC # BLD AUTO: 14.6 10^3/UL (ref 4–10.5)

## 2019-01-31 RX ADMIN — Medication SCH ML: at 21:43

## 2019-01-31 RX ADMIN — FOLIC ACID SCH MLS/HR: 5 INJECTION, SOLUTION INTRAMUSCULAR; INTRAVENOUS; SUBCUTANEOUS at 17:29

## 2019-01-31 RX ADMIN — Medication SCH: at 13:39

## 2019-01-31 RX ADMIN — IPRATROPIUM BROMIDE AND ALBUTEROL SULFATE SCH: 2.5; .5 SOLUTION RESPIRATORY (INHALATION) at 01:18

## 2019-01-31 RX ADMIN — HEPARIN SODIUM SCH UNIT: 5000 INJECTION, SOLUTION INTRAVENOUS; SUBCUTANEOUS at 21:38

## 2019-01-31 RX ADMIN — LORAZEPAM PRN MG: 2 INJECTION INTRAMUSCULAR; INTRAVENOUS at 03:27

## 2019-01-31 RX ADMIN — LEVALBUTEROL HYDROCHLORIDE SCH: 1.25 SOLUTION RESPIRATORY (INHALATION) at 21:16

## 2019-01-31 RX ADMIN — ACETAMINOPHEN PRN MG: 325 TABLET ORAL at 03:32

## 2019-01-31 RX ADMIN — QUETIAPINE FUMARATE SCH MG: 100 TABLET, FILM COATED ORAL at 21:38

## 2019-01-31 RX ADMIN — LEVALBUTEROL HYDROCHLORIDE SCH MG: 1.25 SOLUTION RESPIRATORY (INHALATION) at 08:51

## 2019-01-31 RX ADMIN — Medication SCH ML: at 06:19

## 2019-01-31 RX ADMIN — HEPARIN SODIUM SCH UNIT: 5000 INJECTION, SOLUTION INTRAVENOUS; SUBCUTANEOUS at 05:44

## 2019-01-31 RX ADMIN — CEFEPIME HYDROCHLORIDE SCH MLS/HR: 2 INJECTION, SOLUTION INTRAVENOUS at 10:49

## 2019-01-31 RX ADMIN — SODIUM CHLORIDE PRN MLS/HR: 9 INJECTION, SOLUTION INTRAVENOUS at 13:44

## 2019-01-31 RX ADMIN — HEPARIN SODIUM SCH: 5000 INJECTION, SOLUTION INTRAVENOUS; SUBCUTANEOUS at 13:39

## 2019-01-31 RX ADMIN — ACETAMINOPHEN PRN MG: 325 TABLET ORAL at 17:29

## 2019-01-31 RX ADMIN — SODIUM CHLORIDE PRN MLS/HR: 9 INJECTION, SOLUTION INTRAVENOUS at 04:40

## 2019-01-31 RX ADMIN — VANCOMYCIN HYDROCHLORIDE SCH: 1 INJECTION, POWDER, LYOPHILIZED, FOR SOLUTION INTRAVENOUS at 21:41

## 2019-01-31 RX ADMIN — ACETAMINOPHEN PRN MG: 325 TABLET ORAL at 08:10

## 2019-01-31 RX ADMIN — ACETAMINOPHEN PRN MG: 325 TABLET ORAL at 23:35

## 2019-01-31 RX ADMIN — VANCOMYCIN HYDROCHLORIDE SCH MLS/HR: 1 INJECTION, POWDER, LYOPHILIZED, FOR SOLUTION INTRAVENOUS at 10:50

## 2019-01-31 RX ADMIN — IPRATROPIUM BROMIDE AND ALBUTEROL SULFATE SCH: 2.5; .5 SOLUTION RESPIRATORY (INHALATION) at 08:50

## 2019-01-31 RX ADMIN — CEFEPIME HYDROCHLORIDE SCH MLS/HR: 2 INJECTION, SOLUTION INTRAVENOUS at 21:38

## 2019-01-31 NOTE — PROGRESS NOTE
Provider Note


Provider Note: 


ID Consult Note





Asked to review chart and spoke with Dr Ro briefly via telephone. Pt not 

seen or examined. Mr. Cuevas is a 18 year old man who injects IV heroin who 

presented with SOB, fever, was found to have bacteremia with a Gram positive 

organism pending identification. He also has a TTE with a large mobile 

echodensity on the tricuspid valve with moderate to severe TR and likely septic 

pulmonary emboli. Creatinine has improved. He is on 3L O2. He continues to have 

leukocytosis WBC 15 and mild thrombocytopenia. CTA chest read pending.





Impression/Recommendations


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

and septic pulmonary emboli


- Pending identification and susceptibilities, IV vancomycin should be 

continued, dosed by Pharmacy to achieve goal troughs of 15-20. 


- With no GNRs identified from the blood cultures, cefepime can be discontinued


- Indications for surgery for right sided IE are less clear than for left sided 

IE. Generally the prognosis for R sided IE is relatively good and, with IVDU, 

the patient is at higher risk for recurrent infection if the valve needs to be 

replaced (rather than debridement/repair).  Surgical consultation would need to 

be sought if the patient does not end up clearing the bacteremia despite ad

equate therapy or develops medically refractory heart failure. Cardiac surgery 

consultation may be needed as large tricuspid valve vegetation size increases 

the risk for further embolization to the lungs, and whether this requires early 

operative intervention is a matter of clinical judgment.





Gonzalo Blackwell MD


[facility] Infectious Diseases


pager [phone]

## 2019-01-31 NOTE — RADIOLOGY REPORT (SQ)
EXAM DESCRIPTION:  CHEST SINGLE VIEW



COMPLETED DATE/TIME:  1/31/2019 1:16 pm



REASON FOR STUDY:  Conrifm Central Line Placement



COMPARISON:  1/29/2019



EXAM PARAMETERS:  NUMBER OF VIEWS: One view.

TECHNIQUE: Single frontal radiographic view of the chest acquired.

RADIATION DOSE: NA

LIMITATIONS: None.



FINDINGS:  LUNGS AND PLEURA: Considerable opacification both lung bases.  Small pleural effusions.  C
annot exclude mild pulmonary edema.

MEDIASTINUM AND HILAR STRUCTURES: No masses.  Contour normal.

HEART AND VASCULAR STRUCTURES: Heart size is borderline.

BONES: No acute findings.

HARDWARE: Right internal jugular catheter has its tip in the superior vena cava.

OTHER: No other significant finding.



IMPRESSION:  Borderline cardiomegaly with mild pulmonary edema.  There appears to be airspace disease
 in both lower lobes, pneumonia versus atelectasis.  Left pleural effusion.  Small somewhat loculated
 right pleural effusion.  Right internal jugular catheter as described.



TECHNICAL DOCUMENTATION:  JOB ID:  [id number]

 2011 [employer]- All Rights Reserved



Reading location - IP/workstation name: [email or web address]

## 2019-01-31 NOTE — RADIOLOGY REPORT (SQ)
EXAM DESCRIPTION:  CTA CHEST



COMPLETED DATE/TIME:  1/31/2019 5:03 pm



REASON FOR STUDY:  SOB, Admitted for Endocarditis, IV Drug Abuse



COMPARISON:  Chest radiograph



TECHNIQUE:  CT scan of the chest performed using helical scanning technique with dynamic intravenous 
contrast injection.  Images reviewed with lung, soft tissue and bone windows.  Reconstructed coronal 
and sagittal MPR images reviewed.

Additional 3 dimensional post-processing performed to develop Maximal Intensity Projection images (MI
P).  All images stored on PACS.

All CT scanners at this facility use dose modulation, iterative reconstruction, and/or weight based d
osing when appropriate to reduce radiation dose to as low as reasonably achievable (ALARA).

[facility]: Dose Right  [facility]: CareDose    [facility]: Dose Right    [facility]: Teradose 4D    [facility]: Smart Technologies



CONTRAST TYPE AND DOSE:  contrast/concentration: Isovue 350.00 mg/ml; Total Contrast Delivered: 68.0 
ml; Total Saline Delivered: 108.0 ml

Contrast bolus optimized for the pulmonary arteries. Not diagnostic for the aorta.



RENAL FUNCTION:  None required. The patient is less than 50 years old.



RADIATION DOSE:  CT Rad equipment meets quality standard of care and radiation dose reduction techniq
ues were employed. CTDIvol: 14.3 - 16.5 mGy. DLP: 503 mGy-cm. .



LIMITATIONS:  None.



FINDINGS:  LUNGS AND PLEURA: There are multiple bilateral, lower lobe predominant nodules and cavitar
y masses of the bilateral lungs.  Small associated pleural effusions.

AORTA AND GREAT VESSELS: No aneurysm.  Contrast bolus not optimized for the aorta.

HEART: No pericardial effusion. No significant coronary artery calcifications.

PULMONARY ARTERIES: There is segmental thrombus present in the left lower lobe, right middle lobe, an
d right lower lobe adjacent to cavitary lesions.

HILAR AND MEDIASTINAL STRUCTURES: No identified masses or abnormal nodes.

HARDWARE: None in the chest.

UPPER ABDOMEN: No significant findings.  Limited exam.

THYROID AND OTHER SOFT TISSUES: No masses.  No adenopathy.

BONES: No acute or significant finding.

3D MIPS: Confirm above findings.

OTHER: No other significant finding.



IMPRESSION:  1.  Multifocal thrombus present in the bilateral segmental pulmonary arteries adjacent t
o cavitary lung lesions.  It is unclear whether this is embolic or thrombus in situ as both may be se
en in the setting of cavitary lung disease.  No CT evidence of right heart strain.

2.  Advanced multifocal cavitary pulmonary disease in keeping with reported clinical history of endoc
arditis and septic embolism.



COMMENT:  Quality ID # [id number]: Final reports with documentation of one or more dose reduction techniques
 (e.g., Automated exposure control, adjustment of the mA and/or kV according to patient size, use of 
iterative reconstruction technique)



TECHNICAL DOCUMENTATION:  JOB ID:  [id number]

 2011 [employer]- All Rights Reserved



Reading location - IP/workstation name: [email or web address]

## 2019-01-31 NOTE — PDOC PROGRESS REPORT
Subjective


Progress Note for:: 01/31/19


Subjective:: 


MARCELLA BUCHANAN is a 18 year old male with a past medical history of depression, 

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 

with shortness of breath and cough.  





He is found to have hypotension, tachypnea, bilateral pneumonia, acute renal 

failure and hyponatremia.  Was started on IV saline, empiric antibiotics and 

referred to the hospitalist for admission.  





On admission he complained of sharp right-sided chest pain with deep inhalation 

and coughing which is nonproductive.  Patient appears chronically ill and pale, 

admits his last use 7 hours prior to presentation and has needle tracking 

without open ulcer or phlebitis on his right arm.  Patient is unaware of HIV, 

hepatitis status. Patient formerly prescribed Seroquel for depression.








Overnight patient was transferred to [facility] my morning round patient was found to 

be obtunded tachypneic tachycardic requiring more oxygen pulse were noted to be 

pinpoint and sluggish.  After receiving 2 doses of Narcan patient became 

responsive and was transferred to ICU for more acute care and in case patient 

needed to intubated. 





-129, pulse 114-136, RR 22-49, highest temperature 101, saturating 100% 

on 3 L nasal cannula.





Blood cultures growing gram-positive cocci in clusters pending sensitivity.








Reason For Visit: 


SEPSIS, IV DRUGS C PNEUMONIA








Physical Exam


Vital Signs: 


                                        











Temp Pulse Resp BP Pulse Ox


 


 99.8 F   136 H  37 H  123/76   100 


 


 01/31/19 18:00  01/31/19 18:00  01/31/19 18:00  01/31/19 18:00  01/31/19 18:00








                                 Intake & Output











 01/30/19 01/31/19 02/01/19





 06:59 06:59 06:59


 


Intake Total 3000 6593 1450


 


Output Total 800 1750 


 


Balance 2200 4843 1450


 


Weight 50.9 kg 55.9 kg 











General appearance: PRESENT: mild distress


Head exam: PRESENT: atraumatic, normocephalic


Respiratory exam: PRESENT: accessory muscle use, crackles, tachypnea


Cardiovascular exam: PRESENT: diastolic murmur, systolic murmur, tachycardia


GI/Abdominal exam: PRESENT: normal bowel sounds, soft.  ABSENT: distended, 

guarding, mass, organolmegaly, rebound, tenderness


Neurological exam: PRESENT: alert, awake, oriented to person, oriented to place,

CN II-XII grossly intact





Results


Laboratory Results: 


                                        





                                 01/31/19 05:05 





                                 01/31/19 05:05 





                                        











  01/31/19 01/31/19 01/31/19





  05:05 05:05 06:00


 


WBC  14.6 H  


 


RBC  2.94 L  


 


Hgb  8.8 L  


 


Hct  25.3 L  


 


MCV  86  


 


MCH  29.9  


 


MCHC  34.7  


 


RDW  15.8 H  


 


Plt Count  104 L  


 


Seg Neutrophils %  77.0  


 


Lymphocytes %  14.8  


 


Monocytes %  7.5  


 


Eosinophils %  0.3  


 


Basophils %  0.4  


 


Absolute Neutrophils  11.3 H  


 


Absolute Lymphocytes  2.2  


 


Absolute Monocytes  1.1  


 


Absolute Eosinophils  0.0  


 


Absolute Basophils  0.1  


 


Carbonic Acid    0.82 L


 


HCO3/H2CO3 Ratio    24:1


 


ABG pH    7.49 H


 


ABG pCO2    27.1 L


 


ABG pO2    116.3 H


 


ABG HCO3    20.1


 


ABG O2 Saturation    98.6 H


 


ABG Base Excess    -2.6


 


FiO2    ROOM AIR


 


Sodium   139.0 


 


Potassium   3.1 L 


 


Chloride   112 H 


 


Carbon Dioxide   20 L 


 


Anion Gap   7 


 


BUN   16  D 


 


Creatinine   0.62 


 


Est GFR ( Amer)   > 60 


 


Est GFR (Non-Af Amer)   > 60 


 


Glucose   102 


 


Calcium   7.3 L 


 


Magnesium   2.4 H 


 


Total Bilirubin   0.5 


 


AST   17 


 


ALT   17 L 


 


Alkaline Phosphatase   77 


 


Total Protein   4.5 L 


 


Albumin   1.8 L 














  01/31/19





  10:30


 


WBC 


 


RBC 


 


Hgb 


 


Hct 


 


MCV 


 


MCH 


 


MCHC 


 


RDW 


 


Plt Count 


 


Seg Neutrophils % 


 


Lymphocytes % 


 


Monocytes % 


 


Eosinophils % 


 


Basophils % 


 


Absolute Neutrophils 


 


Absolute Lymphocytes 


 


Absolute Monocytes 


 


Absolute Eosinophils 


 


Absolute Basophils 


 


Carbonic Acid  0.98 L


 


HCO3/H2CO3 Ratio  22:1


 


ABG pH  7.45


 


ABG pCO2  32.4 L


 


ABG pO2  111.2 H


 


ABG HCO3  22.2


 


ABG O2 Saturation  98.3 H


 


ABG Base Excess  -1.2


 


FiO2  3L


 


Sodium 


 


Potassium 


 


Chloride 


 


Carbon Dioxide 


 


Anion Gap 


 


BUN 


 


Creatinine 


 


Est GFR ( Amer) 


 


Est GFR (Non-Af Amer) 


 


Glucose 


 


Calcium 


 


Magnesium 


 


Total Bilirubin 


 


AST 


 


ALT 


 


Alkaline Phosphatase 


 


Total Protein 


 


Albumin 








                                        





01/29/19 17:55   Clean Catch Midstream   Urine Culture - Final


                            NO GROWTH 2 DAYS





                                        











  01/29/19 01/29/19





  16:35 16:35


 


Creatine Kinase  122 


 


CK-MB (CK-2)   1.66


 


Troponin I   < 0.012











Impressions: 


                                        





Apical Lordotic X-Ray  01/29/19 00:00


IMPRESSION:


 


1.  New right IJ line with tip near cavoatrial junction. No


pneumothorax.


2.  Unchanged small right pleural effusion and bilateral airspace


opacities.


 








Chest/Abdomen CTA  01/31/19 00:00


IMPRESSION:  1.  Multifocal thrombus present in the bilateral segmental 

pulmonary arteries adjacent to cavitary lung lesions.  It is unclear whether 

this is embolic or thrombus in situ as both may be seen in the setting of 

cavitary lung disease.  No CT evidence of right heart strain.


2.  Advanced multifocal cavitary pulmonary disease in keeping with reported 

clinical history of endocarditis and septic embolism.


 








Chest X-Ray  01/31/19 12:42


IMPRESSION:  Borderline cardiomegaly with mild pulmonary edema.  There appears 

to be airspace disease in both lower lobes, pneumonia versus atelectasis.  Left 

pleural effusion.  Small somewhat loculated right pleural effusion.  Right 

internal jugular catheter as described.


 














Assessment & Plan





- Diagnosis


(1) Endocarditis


Qualifiers: 


   Endocarditis type: infective   Chronicity: acute 


Is this a current diagnosis for this admission?: Yes   


Plan: 


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 


Most likely secondary to IV drug abuse.


Blood culture positive for gram-positive cocci in clusters likely staph.  

Pending susceptibility.


ID has been consulted.  Please refer to note.


Continue vancomycin and cefepime for now pending final blood cultures result.








(2) Pulmonary emboli


Is this a current diagnosis for this admission?: Yes   


Plan: 


Most likely septic emboli caused by underlying endocarditis.


Monitor vitals.  Continue underlying endocarditis treatment.








(3) Sepsis


Qualifiers: 


   Sepsis type: sepsis due to unspecified organism   Qualified Code(s): A41.9 - 

Sepsis, unspecified organism   


Is this a current diagnosis for this admission?: Yes   


Plan: 


Likely due to underlying infectious process.  Continue empiric IV antibiotics.  

Continue IV fluids monitor volume status and vitals.








(4) Gram-positive bacteremia


Is this a current diagnosis for this admission?: Yes   


Plan: 


Likely caused by continuous IV drug abuse. 


Prelim blood cultures 2/2 are growing gram-positive cocci pending 

susceptibility.


Continue IV empiric antibiotics.








(5) Pneumonia


Qualifiers: 


   Pneumonia type: due to unspecified organism   Laterality: bilateral   Lung 

location: unspecified part of lung   Qualified Code(s): J18.9 - Pneumonia, 

unspecified organism   


Is this a current diagnosis for this admission?: Yes   


Plan: 


Likely complicated by continuous IV drug abuse.  


Continue IV antibiotics.  Follow-up cultures.








(6) Hypocalcemia


Is this a current diagnosis for this admission?: Yes   


Plan: 


Resolved.  CMP tomorrow.








(7) IV drug abuse


Is this a current diagnosis for this admission?: Yes   


Plan: 


Supportive measures.  Monitor for withdrawals.

## 2019-02-01 VITALS — DIASTOLIC BLOOD PRESSURE: 66 MMHG | SYSTOLIC BLOOD PRESSURE: 128 MMHG

## 2019-02-01 LAB
ADD MANUAL DIFF: NO
ALBUMIN SERPL-MCNC: 1.8 G/DL (ref 3.5–5)
ALP SERPL-CCNC: 84 U/L (ref 38–126)
ALT SERPL-CCNC: 24 U/L (ref 21–72)
ANION GAP SERPL CALC-SCNC: 3 MMOL/L (ref 5–19)
AST SERPL-CCNC: 20 U/L (ref 17–59)
BASOPHILS # BLD AUTO: 0.1 10^3/UL (ref 0–0.2)
BASOPHILS NFR BLD AUTO: 0.4 % (ref 0–2)
BILIRUB DIRECT SERPL-MCNC: 0.1 MG/DL (ref 0–0.4)
BILIRUB SERPL-MCNC: 0.4 MG/DL (ref 0.2–1.3)
BUN SERPL-MCNC: 10 MG/DL (ref 7–20)
CALCIUM: 7.2 MG/DL (ref 8.4–10.2)
CHLORIDE SERPL-SCNC: 113 MMOL/L (ref 98–107)
CO2 SERPL-SCNC: 22 MMOL/L (ref 22–30)
EOSINOPHIL # BLD AUTO: 0 10^3/UL (ref 0–0.6)
EOSINOPHIL NFR BLD AUTO: 0.3 % (ref 0–6)
ERYTHROCYTE [DISTWIDTH] IN BLOOD BY AUTOMATED COUNT: 15.7 % (ref 11.5–14)
GLUCOSE SERPL-MCNC: 101 MG/DL (ref 75–110)
HCT VFR BLD CALC: 25.8 % (ref 37.9–51)
HGB BLD-MCNC: 8.8 G/DL (ref 13.5–17)
LYMPHOCYTES # BLD AUTO: 2.4 10^3/UL (ref 0.5–4.7)
LYMPHOCYTES NFR BLD AUTO: 16.6 % (ref 13–45)
MCH RBC QN AUTO: 29.5 PG (ref 27–33.4)
MCHC RBC AUTO-ENTMCNC: 34.2 G/DL (ref 32–36)
MCV RBC AUTO: 86 FL (ref 80–97)
MONOCYTES # BLD AUTO: 1.1 10^3/UL (ref 0.1–1.4)
MONOCYTES NFR BLD AUTO: 7.7 % (ref 3–13)
NEUTROPHILS # BLD AUTO: 11 10^3/UL (ref 1.7–8.2)
NEUTS SEG NFR BLD AUTO: 75 % (ref 42–78)
PLATELET # BLD: 136 10^3/UL (ref 150–450)
POTASSIUM SERPL-SCNC: 3.9 MMOL/L (ref 3.6–5)
PROT SERPL-MCNC: 4.5 G/DL (ref 6.3–8.2)
RBC # BLD AUTO: 2.99 10^6/UL (ref 4.35–5.55)
SODIUM SERPL-SCNC: 138.2 MMOL/L (ref 137–145)
TOTAL CELLS COUNTED % (AUTO): 100 %
WBC # BLD AUTO: 14.6 10^3/UL (ref 4–10.5)

## 2019-02-01 RX ADMIN — VANCOMYCIN HYDROCHLORIDE SCH MLS/HR: 1 INJECTION, POWDER, LYOPHILIZED, FOR SOLUTION INTRAVENOUS at 08:04

## 2019-02-01 RX ADMIN — Medication SCH ML: at 06:56

## 2019-02-01 RX ADMIN — FOLIC ACID SCH MLS/HR: 5 INJECTION, SOLUTION INTRAMUSCULAR; INTRAVENOUS; SUBCUTANEOUS at 17:48

## 2019-02-01 RX ADMIN — VANCOMYCIN HYDROCHLORIDE SCH MLS/HR: 1 INJECTION, POWDER, LYOPHILIZED, FOR SOLUTION INTRAVENOUS at 15:27

## 2019-02-01 RX ADMIN — LEVALBUTEROL HYDROCHLORIDE SCH: 1.25 SOLUTION RESPIRATORY (INHALATION) at 07:40

## 2019-02-01 RX ADMIN — HEPARIN SODIUM SCH UNIT: 5000 INJECTION, SOLUTION INTRAVENOUS; SUBCUTANEOUS at 15:26

## 2019-02-01 RX ADMIN — LEVALBUTEROL HYDROCHLORIDE SCH: 1.25 SOLUTION RESPIRATORY (INHALATION) at 20:43

## 2019-02-01 RX ADMIN — Medication SCH ML: at 15:26

## 2019-02-01 RX ADMIN — HEPARIN SODIUM SCH UNIT: 5000 INJECTION, SOLUTION INTRAVENOUS; SUBCUTANEOUS at 06:55

## 2019-02-01 NOTE — PDOC PROGRESS REPORT
Subjective


Progress Note for:: 02/01/19


Subjective:: 





laying in bed with his head under the sheet- answers questions but doesnt really

open his eyes.  states it hurts to take a deep breath


Reason For Visit: 


SEPSIS, IV DRUGS C PNEUMONIA








Physical Exam


Vital Signs: 


                                        











Temp Pulse Resp BP Pulse Ox


 


 99.3 F   135 H  38 H  131/85 H  93 


 


 02/01/19 04:00  02/01/19 08:00  02/01/19 09:31  02/01/19 09:31  02/01/19 09:31








                                 Intake & Output











 01/31/19 02/01/19 02/02/19





 06:59 06:59 06:59


 


Intake Total 6593 3323 250


 


Output Total 1750 860 


 


Balance 4843 2463 250


 


Weight 123 lb 3.814 oz 134 lb 14.766 oz 











General appearance: PRESENT: no acute distress


Head exam: PRESENT: atraumatic, normocephalic


Eye exam: PRESENT: EOMI.  ABSENT: conjunctival injection, scleral icterus


Ear exam: PRESENT: normal external ear exam


Mouth exam: PRESENT: tongue midline


Neck exam: ABSENT: tracheal deviation


Respiratory exam: PRESENT: decreased breath sounds - bilaterally, symmetrical


Cardiovascular exam: PRESENT: +S1, +S2


Pulses: PRESENT: +2 pedal pulses bilateral


GI/Abdominal exam: PRESENT: normal bowel sounds, soft.  ABSENT: tenderness


Extremities exam: PRESENT: +2 edema


Neurological exam: PRESENT: alert, oriented to person, oriented to place, 

oriented to time, other - face under sheet- he wont open eyes or look at me as 

he talks to me


Skin exam: PRESENT: other - small areas of dark spots on skin all over his body.

non tender





Results


Laboratory Results: 


                                        





                                 02/01/19 07:00 





                                 02/01/19 07:00 





                                        











  01/31/19 01/31/19 02/01/19





  10:30 21:10 07:00


 


WBC    14.6 H


 


RBC    2.99 L


 


Hgb    8.8 L


 


Hct    25.8 L


 


MCV    86


 


MCH    29.5


 


MCHC    34.2


 


RDW    15.7 H


 


Plt Count    136 L


 


Seg Neutrophils %    75.0


 


Lymphocytes %    16.6


 


Monocytes %    7.7


 


Eosinophils %    0.3


 


Basophils %    0.4


 


Absolute Neutrophils    11.0 H


 


Absolute Lymphocytes    2.4


 


Absolute Monocytes    1.1


 


Absolute Eosinophils    0.0


 


Absolute Basophils    0.1


 


Carbonic Acid  0.98 L  


 


HCO3/H2CO3 Ratio  22:1  


 


ABG pH  7.45  


 


ABG pCO2  32.4 L  


 


ABG pO2  111.2 H  


 


ABG HCO3  22.2  


 


ABG O2 Saturation  98.3 H  


 


ABG Base Excess  -1.2  


 


FiO2  3L  


 


Sodium   


 


Potassium   


 


Chloride   


 


Carbon Dioxide   


 


Anion Gap   


 


BUN   


 


Creatinine   0.55 


 


Est GFR ( Amer)   > 60 


 


Est GFR (Non-Af Amer)   > 60 


 


Glucose   


 


Calcium   


 


Magnesium   


 


Total Bilirubin   


 


AST   


 


ALT   


 


Alkaline Phosphatase   


 


Total Protein   


 


Albumin   














  02/01/19





  07:00


 


WBC 


 


RBC 


 


Hgb 


 


Hct 


 


MCV 


 


MCH 


 


MCHC 


 


RDW 


 


Plt Count 


 


Seg Neutrophils % 


 


Lymphocytes % 


 


Monocytes % 


 


Eosinophils % 


 


Basophils % 


 


Absolute Neutrophils 


 


Absolute Lymphocytes 


 


Absolute Monocytes 


 


Absolute Eosinophils 


 


Absolute Basophils 


 


Carbonic Acid 


 


HCO3/H2CO3 Ratio 


 


ABG pH 


 


ABG pCO2 


 


ABG pO2 


 


ABG HCO3 


 


ABG O2 Saturation 


 


ABG Base Excess 


 


FiO2 


 


Sodium  138.2


 


Potassium  3.9


 


Chloride  113 H


 


Carbon Dioxide  22


 


Anion Gap  3 L


 


BUN  10


 


Creatinine  0.55


 


Est GFR ( Amer)  > 60


 


Est GFR (Non-Af Amer)  > 60


 


Glucose  101


 


Calcium  7.2 L


 


Magnesium  2.2


 


Total Bilirubin  0.4


 


AST  20


 


ALT  24


 


Alkaline Phosphatase  84


 


Total Protein  4.5 L


 


Albumin  1.8 L








                                        





01/29/19 16:35   Blood   Blood Culture - Final


                            Staphylococcus Aureus


01/29/19 17:58   Blood   Blood Culture - Final


                            Staphylococcus Aureus


01/29/19 17:55   Clean Catch Midstream   Urine Culture - Final


                            NO GROWTH 2 DAYS





                                        











  01/29/19 01/29/19





  16:35 16:35


 


Creatine Kinase  122 


 


CK-MB (CK-2)   1.66


 


Troponin I   < 0.012











Impressions: 


                                        





Apical Lordotic X-Ray  01/29/19 00:00


IMPRESSION:


 


1.  New right IJ line with tip near cavoatrial junction. No


pneumothorax.


2.  Unchanged small right pleural effusion and bilateral airspace


opacities.


 








Chest/Abdomen CTA  01/31/19 00:00


IMPRESSION:  1.  Multifocal thrombus present in the bilateral segmental 

pulmonary arteries adjacent to cavitary lung lesions.  It is unclear whether 

this is embolic or thrombus in situ as both may be seen in the setting of 

cavitary lung disease.  No CT evidence of right heart strain.


2.  Advanced multifocal cavitary pulmonary disease in keeping with reported 

clinical history of endocarditis and septic embolism.


 








Chest X-Ray  01/31/19 12:42


IMPRESSION:  Borderline cardiomegaly with mild pulmonary edema.  There appears 

to be airspace disease in both lower lobes, pneumonia versus atelectasis.  Left 

pleural effusion.  Small somewhat loculated right pleural effusion.  Right 

internal jugular catheter as described.


 














Assessment & Plan





- Diagnosis


(1) Endocarditis


Qualifiers: 


   Endocarditis type: infective   Chronicity: acute 


Is this a current diagnosis for this admission?: Yes   





(2) Gram-positive bacteremia


Is this a current diagnosis for this admission?: Yes   





(3) IV drug abuse


Is this a current diagnosis for this admission?: Yes   





(4) Murmur


Is this a current diagnosis for this admission?: Yes   





(5) Pneumonia


Qualifiers: 


   Pneumonia type: due to unspecified organism   Laterality: bilateral   Lung 

location: unspecified part of lung   Qualified Code(s): J18.9 - Pneumonia, 

unspecified organism   


Is this a current diagnosis for this admission?: Yes   





(6) Pulmonary emboli


Is this a current diagnosis for this admission?: Yes   





(7) Sepsis


Qualifiers: 


   Sepsis type: sepsis due to unspecified organism   Qualified Code(s): A41.9 - 

Sepsis, unspecified organism   


Is this a current diagnosis for this admission?: Yes   





(8) Tachypnea


Is this a current diagnosis for this admission?: Yes   





(9) Pulmonary cavitary lesion


Is this a current diagnosis for this admission?: Yes   





(10) Septic pulmonary embolism


Is this a current diagnosis for this admission?: Yes   





- Time


Total Critical Time (Minutes): 32





- Inpatient Certification


Based on my medical assessment, after consideration of the patient's 

comorbidities, presenting symptoms, or acuity I expect that the services needed 

warrant INPATIENT care.: Yes


I certify that my determination is in accordance with my understanding of 

Medicare's requirements for reasonable and necessary INPATIENT services [42 CFR 

412.3e].: Yes


Medical Necessity: Significant Comorbidiites Make Outpatient Treatment Too 

Risky, Need For Continuous Telemetry Monitoring, Need for IV Antibiotics





- Plan Summary


Plan Summary: 





endocarditis- on Vanco- stopped cefepime today- positive BCx for Staph- 

pansensitive except for erythromycin and clinda.  c/w vanco.  he has vegetation 

on right side of his heart- Tricuspid.  appreciate ID consult. concern now is 

about his valve- i am not sure if CT surgery anywhere will want to surgical 

intervention while he's infected and has been actively using IV drugs within 

last 2 weeks prior to admission.  i spoke with dr aranda about his unofficially

and he will provide me with a CT surgery contact and i will discuss further 

about this valve. 


tachypnea- RR >30 - i am worried he will tire out soon and will need to be on 

the vent if he can't protect airway. 


Septic emboli- seen on CTA and also vegetation noted. 


Cavitary lesion- AFB sent and pending- air borne precautions for onw.


sepsis- 2/2 above. 


IVDU- h/o heroin use





restrict visitors to his room- as there's suspicion about his significant other 

bringing in illicit drugs for him.

## 2019-02-01 NOTE — PDOC TRANSFER SUMMARY
General


Admission Date/PCP: 


  01/29/19 19:39





  





Accepting Facility: [facility]


Resuscitation Status: Full Code





- Transfer Diagnosis


(1) Endocarditis


Is this a current diagnosis for this admission?: Yes   





(2) Gram-positive bacteremia


Is this a current diagnosis for this admission?: Yes   





(3) IV drug abuse


Is this a current diagnosis for this admission?: Yes   





(4) Murmur


Is this a current diagnosis for this admission?: Yes   





(5) Pneumonia


Is this a current diagnosis for this admission?: Yes   





(6) Pulmonary emboli


Is this a current diagnosis for this admission?: Yes   





(7) Sepsis


Is this a current diagnosis for this admission?: Yes   





(8) Tachypnea


Is this a current diagnosis for this admission?: Yes   





(9) Pulmonary cavitary lesion


Is this a current diagnosis for this admission?: Yes   





(10) Septic pulmonary embolism


Is this a current diagnosis for this admission?: Yes   





- Transfer Medications


Home Medications: 


                                        





No Home Medications  01/29/19 








Transfer Medications: 


                               Current Medications





Acetaminophen (Tylenol 325 Mg Tablet)  650 mg PO Q4HP PRN


   PRN Reason: pain or temp greater than 101F


   Stop: 02/28/19 19:50


   Last Admin: 01/31/19 23:35 Dose:  650 mg


   Documented by: 


Heparin Sodium (Porcine) (Heparin Inj 5,000 Units/Ml 1 Ml Syringe)  5,000 unit 

SUBCUT Q8 [facility]


   Stop: 02/28/19 21:59


   Last Admin: 02/01/19 15:26 Dose:  5,000 unit


   Documented by: 


Sodium Chloride (Nacl 0.9% 1000 Ml Iv Soln)  1,000 mls @ 125 mls/hr IV 

CONTINUOUS PRN


   PRN Reason: THIS MED IS NOT "PRN"


   Stop: 03/01/19 09:47


   Last Admin: 01/31/19 13:44 Dose:  125 mls/hr


   Documented by: 


Potassium Chloride 20 meq/Magnesium Sulfate 8 meq/Thiamine HCl 100 

mg/Multivitamins/Minerals 10 ml/Sodium Chloride  1,023 mls @ 100 mls/hr IV QPM 

[facility]


   Stop: 03/01/19 17:59


   Last Infusion: 02/01/19 04:10 Dose:  Infused


   Documented by: 


Vancomycin HCl 1,000 mg/ (Dextrose)  250 mls @ 166.667 mls/hr IV Q8 [facility]


   Stop: 02/08/19 07:59


   Last Admin: 02/01/19 15:27 Dose:  166.67 mls/hr, 166.67 mls/hr


   Documented by: 


Influenza Virus Vaccine Quadrival (Fluarix Adlt Quad 2018-19 Vac 0.5 Ml Syr)  

0.5 ml IM .DISCHARGE PRN


   PRN Reason: THIS MED IS NOT "PRN"


   Stop: 02/28/19 21:27


Levalbuterol HCl (Xopenex Neb 1.25 Mg/3 Ml Ampul)  1.25 mg NEB RTQ12 [facility]


   Stop: 03/01/19 19:59


   Last Admin: 02/01/19 07:40 Dose:  Not Given


   Documented by: 


Lorazepam (Ativan Inj 2 Mg/1 Ml Vial)  1 mg IV Q2HP PRN


   PRN Reason: ANXIETY/AGITATION


   Stop: 02/06/19 18:01


   Last Admin: 01/31/19 03:27 Dose:  1 mg


   Documented by: 


Oxycodone/Acetaminophen (Percocet 5-325 Mg Tablet)  1 tab PO Q4HP PRN


   PRN Reason: PAIN


   Stop: 02/06/19 09:47


Quetiapine Fumarate (Seroquel 100 Mg Tablet)  50 mg PO QHS [facility]


   Stop: 03/01/19 21:59


   Last Admin: 01/31/19 21:38 Dose:  50 mg


   Documented by: 


Sodium Chloride (Saline Flush 2.5 Ml Monoject Prefil Syrin)  2.5 ml IV Q8 [facility]


   Stop: 02/28/19 19:59


   Last Admin: 02/01/19 15:26 Dose:  2.5 ml


   Documented by: 











- Allergies


Allergies/Adverse Reactions: 


                                        





No Known Allergies Allergy (Verified 01/08/17 18:49)


   











- Diet/Activity


Discharge Diet: Regular





Hospital Course


Hospital Course: 





Per admitting physician:


"MARCELLA BUCHANAN is a 18 year old male with a past medical history of depression,

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 

with shortness of breath and cough.  He is found to have hypotension, tachypnea,

bilateral pneumonia, acute renal failure and hyponatremia.  He started on IV 

saline, empiric antibiotics and referred to the hospitalist for admission.  He 

complains of sharp right-sided chest pain with deep inhalation and coughing 

which is nonproductive.  Patient appears chronically ill and pale, admits his 

last use 7 hours prior to presentation and has needle tracking without open 

ulcer or phlebitis on his right arm.  Patient is unaware of HIV, hepatitis 

status.


Patient formerly prescribed Seroquel for depression."








hospital course:





since admission - he had TTE which showed a >6cm circumferential vegetation of 

the Tricuspid. CTA chest was done which showed septic emboli. CTA chest also 

shows cavitary lesion of the lung- concern for TB and AFB has been sent.  he's 

currently on airborne precautions until results return.   Blood culture positive

for Staph Aureus - resistant only to clinda and erythromycin.  Dr Blackwell from [facility]

consulted regarding case- she recommends IV vancomycin.  I assumed care of 

patient this morning- he has been admitted to the ICU- today is day 3.  he's 

tachynic and tachycardic- also having fevers.  he's on RA and Sat >92%. he's 

alert and oriented at this time. i am concerned that he will tire out at some 

point since his respiratory rate is >40 while i am bedside and has been >30 

constantly.  given his current status- he needs a CT surgery consultation for 

possible surgical intervention.  I reached out to [facility] CT surgery - spoke with 

MANOJ Zhou for CT surgery.  later I spoke with Cardiology Fellow Dr Vieyra- who

graciously accepted patient to his service for further management.  





Physical Exam


Vital Signs: 


                                        











Temp Pulse Resp BP Pulse Ox


 


 100.3 F   133 H  26 H  133/113 H  98 


 


 02/01/19 12:00  02/01/19 14:00  02/01/19 15:32  02/01/19 15:32  02/01/19 15:32








                                 Intake & Output











 01/31/19 02/01/19 02/02/19





 06:59 06:59 06:59


 


Intake Total 6593 3323 250


 


Output Total 1750 860 0


 


Balance 4843 2463 250


 


Weight 123 lb 3.814 oz 134 lb 14.766 oz 











General appearance: PRESENT: no acute distress


Head exam: PRESENT: atraumatic, normocephalic


Eye exam: PRESENT: EOMI.  ABSENT: conjunctival injection, scleral icterus


Ear exam: PRESENT: normal external ear exam


Neck exam: ABSENT: tracheal deviation


Respiratory exam: PRESENT: accessory muscle use, decreased breath sounds - 

bilaterally at the bases, symmetrical, tachypnea, wheezes


Cardiovascular exam: PRESENT: +S1, +S2


Pulses: PRESENT: +2 pedal pulses bilateral


GI/Abdominal exam: PRESENT: normal bowel sounds, soft.  ABSENT: tenderness


Extremities exam: ABSENT: pedal edema


Neurological exam: PRESENT: alert, awake, oriented to person, oriented to place,

CN II-XII grossly intact


Skin exam: PRESENT: dry, warm





Results


Laboratory Results: 


                                        





                                 02/01/19 07:00 





                                 02/01/19 07:00 





                                        











  01/31/19 02/01/19 02/01/19





  21:10 07:00 07:00


 


WBC   14.6 H 


 


RBC   2.99 L 


 


Hgb   8.8 L 


 


Hct   25.8 L 


 


MCV   86 


 


MCH   29.5 


 


MCHC   34.2 


 


RDW   15.7 H 


 


Plt Count   136 L 


 


Seg Neutrophils %   75.0 


 


Lymphocytes %   16.6 


 


Monocytes %   7.7 


 


Eosinophils %   0.3 


 


Basophils %   0.4 


 


Absolute Neutrophils   11.0 H 


 


Absolute Lymphocytes   2.4 


 


Absolute Monocytes   1.1 


 


Absolute Eosinophils   0.0 


 


Absolute Basophils   0.1 


 


Sodium    138.2


 


Potassium    3.9


 


Chloride    113 H


 


Carbon Dioxide    22


 


Anion Gap    3 L


 


BUN    10


 


Creatinine  0.55   0.55


 


Est GFR ( Amer)  > 60   > 60


 


Est GFR (Non-Af Amer)  > 60   > 60


 


Glucose    101


 


Calcium    7.2 L


 


Magnesium    2.2


 


Total Bilirubin    0.4


 


AST    20


 


ALT    24


 


Alkaline Phosphatase    84


 


Total Protein    4.5 L


 


Albumin    1.8 L








                                        





01/29/19 16:35   Blood   Blood Culture - Final


                            Staphylococcus Aureus


01/29/19 17:58   Blood   Blood Culture - Final


                            Staphylococcus Aureus





                                        











  01/29/19 01/29/19





  16:35 16:35


 


Creatine Kinase  122 


 


CK-MB (CK-2)   1.66


 


Troponin I   < 0.012











Impressions: 


                                        





Apical Lordotic X-Ray  01/29/19 00:00


IMPRESSION:


 


1.  New right IJ line with tip near cavoatrial junction. No


pneumothorax.


2.  Unchanged small right pleural effusion and bilateral airspace


opacities.


 








Chest/Abdomen CTA  01/31/19 00:00


IMPRESSION:  1.  Multifocal thrombus present in the bilateral segmental pul

monary arteries adjacent to cavitary lung lesions.  It is unclear whether this 

is embolic or thrombus in situ as both may be seen in the setting of cavitary 

lung disease.  No CT evidence of right heart strain.


2.  Advanced multifocal cavitary pulmonary disease in keeping with reported 

clinical history of endocarditis and septic embolism.


 








Chest X-Ray  01/31/19 12:42


IMPRESSION:  Borderline cardiomegaly with mild pulmonary edema.  There appears 

to be airspace disease in both lower lobes, pneumonia versus atelectasis.  Left 

pleural effusion.  Small somewhat loculated right pleural effusion.  Right 

internal jugular catheter as described.


 














Plan


Time Spent: Greater than 30 Minutes

## 2019-02-03 LAB — HEPATITIS C VIRUS ANTIBODY: >11 S/CO RATIO (ref 0–0.9)

## 2019-03-13 ENCOUNTER — HOSPITAL ENCOUNTER (INPATIENT)
Dept: HOSPITAL 62 - 4S | Age: 19
LOS: 10 days | Discharge: ANOTHER HOSPITAL | DRG: 288 | End: 2019-03-23
Attending: INTERNAL MEDICINE | Admitting: INTERNAL MEDICINE
Payer: MEDICAID

## 2019-03-13 DIAGNOSIS — E87.1: ICD-10-CM

## 2019-03-13 DIAGNOSIS — I07.1: ICD-10-CM

## 2019-03-13 DIAGNOSIS — F17.210: ICD-10-CM

## 2019-03-13 DIAGNOSIS — N17.9: ICD-10-CM

## 2019-03-13 DIAGNOSIS — B95.61: ICD-10-CM

## 2019-03-13 DIAGNOSIS — A41.89: ICD-10-CM

## 2019-03-13 DIAGNOSIS — Z82.49: ICD-10-CM

## 2019-03-13 DIAGNOSIS — F15.10: ICD-10-CM

## 2019-03-13 DIAGNOSIS — I33.0: Primary | ICD-10-CM

## 2019-03-13 DIAGNOSIS — F32.9: ICD-10-CM

## 2019-03-13 DIAGNOSIS — F11.20: ICD-10-CM

## 2019-03-13 DIAGNOSIS — I26.90: ICD-10-CM

## 2019-03-13 DIAGNOSIS — J18.9: ICD-10-CM

## 2019-03-13 DIAGNOSIS — Z16.24: ICD-10-CM

## 2019-03-13 DIAGNOSIS — Z79.899: ICD-10-CM

## 2019-03-13 PROCEDURE — 93005 ELECTROCARDIOGRAM TRACING: CPT

## 2019-03-13 PROCEDURE — 93306 TTE W/DOPPLER COMPLETE: CPT

## 2019-03-13 PROCEDURE — 82565 ASSAY OF CREATININE: CPT

## 2019-03-13 PROCEDURE — 83735 ASSAY OF MAGNESIUM: CPT

## 2019-03-13 PROCEDURE — 36415 COLL VENOUS BLD VENIPUNCTURE: CPT

## 2019-03-13 PROCEDURE — 80074 ACUTE HEPATITIS PANEL: CPT

## 2019-03-13 PROCEDURE — 87077 CULTURE AEROBIC IDENTIFY: CPT

## 2019-03-13 PROCEDURE — 80202 ASSAY OF VANCOMYCIN: CPT

## 2019-03-13 PROCEDURE — P9016 RBC LEUKOCYTES REDUCED: HCPCS

## 2019-03-13 PROCEDURE — 81001 URINALYSIS AUTO W/SCOPE: CPT

## 2019-03-13 PROCEDURE — 87070 CULTURE OTHR SPECIMN AEROBIC: CPT

## 2019-03-13 PROCEDURE — 83605 ASSAY OF LACTIC ACID: CPT

## 2019-03-13 PROCEDURE — 87040 BLOOD CULTURE FOR BACTERIA: CPT

## 2019-03-13 PROCEDURE — 85027 COMPLETE CBC AUTOMATED: CPT

## 2019-03-13 PROCEDURE — 71275 CT ANGIOGRAPHY CHEST: CPT

## 2019-03-13 PROCEDURE — 80048 BASIC METABOLIC PNL TOTAL CA: CPT

## 2019-03-13 PROCEDURE — 86920 COMPATIBILITY TEST SPIN: CPT

## 2019-03-13 PROCEDURE — 86850 RBC ANTIBODY SCREEN: CPT

## 2019-03-13 PROCEDURE — 85025 COMPLETE CBC W/AUTO DIFF WBC: CPT

## 2019-03-13 PROCEDURE — 36430 TRANSFUSION BLD/BLD COMPNT: CPT

## 2019-03-13 PROCEDURE — 86901 BLOOD TYPING SEROLOGIC RH(D): CPT

## 2019-03-13 PROCEDURE — 87186 SC STD MICRODIL/AGAR DIL: CPT

## 2019-03-13 PROCEDURE — 71045 X-RAY EXAM CHEST 1 VIEW: CPT

## 2019-03-13 PROCEDURE — 87536 HIV-1 QUANT&REVRSE TRNSCRPJ: CPT

## 2019-03-13 PROCEDURE — 86900 BLOOD TYPING SEROLOGIC ABO: CPT

## 2019-03-13 PROCEDURE — 93010 ELECTROCARDIOGRAM REPORT: CPT

## 2019-03-13 PROCEDURE — 80053 COMPREHEN METABOLIC PANEL: CPT

## 2019-03-13 RX ADMIN — FONDAPARINUX SODIUM SCH: 2.5 INJECTION, SOLUTION SUBCUTANEOUS at 20:29

## 2019-03-13 RX ADMIN — CEFAZOLIN SODIUM SCH MLS/HR: 2 SOLUTION INTRAVENOUS at 20:16

## 2019-03-13 NOTE — PDOC H&P
History of Present Illness


Admission Date/PCP: 


  03/13/19 18:01





  





History of Present Illness: 


MARCELLA BUCHANAN is a 18 year old  male with past medical history of 

depression IV drug abuse with methamphetamine and heroin who was admitted on 

January 29 with a diagnosis of infective endocarditis and septic pulmonary 

emboli.  Patient presented with shortness of breath and cough and found to have 

hypotension tachypnea bilateral cavitary pneumonia acute renal failure and 

hyponatremia.  Here at [facility] trans-thoracic echocardiogram 

revealed greater than 6 cm circumferential vegetation of tricuspid and his CTA 

chest was done which showed septic emboli and cavitary lesions of the lung will 

culture was positive for staph aureus which is resistant only to clindamycin and

erythromycin.  And has been managed empirically with antibiotics here at [facility] R patient transferred to [facility] for further 

evaluation and management.  With impression of MSSA tricuspid valve endocarditis

with pulmonary emboli patient has been managed with antibiotics and he is also 

status post angiovac debridement twice.  Reportedly the patient is not a 

candidate for valve repair or acute surgical intervention.  Patient transferred 

back to [facility] to further continue his antibiotics namely 

Ancef until March 15, 2019.  They recommended also outpatient follow-up with CT 

surgery and repeat echo after antibiotic therapy.








Past Medical History


Psychiatric Medical History: Reports: Depression





Social History


Smoking Status: Current Every Day Smoker


Frequency of Alcohol Use: None


Hx Recreational Drug Use: Yes


Drugs: Heroin





- Advance Directive


Resuscitation Status: Full Code





Family History


Family History: Hypertension


Parental Family History Reviewed: Yes


Children Family History Reviewed: Yes


Sibling(s) Family History Reviewed.: Yes





Medication/Allergy


Home Medications: 








Acetaminophen [Tylenol 325 mg Tablet] 650 mg PO Q4HP PRN  tablet 02/01/19 


Levalbuterol HCl [Xopenex Neb 1.25 mg/3 ml Ampul] 1.25 mg NEB RTQ12  vial.neb 

02/01/19 


Oxycodone HCl/Acetaminophen [Percocet 5-325 mg Tablet] 1 tab PO Q4HP PRN  tablet

02/01/19 


Quetiapine Fumarate [Seroquel 100 mg Tablet] 50 mg PO QHS  tablet 02/01/19 


Vancomycin HCl [Vancocin Inj 1000 mg Vial] 1,000 mg IV Q8  vial 02/01/19 








Allergies/Adverse Reactions: 


                                        





No Known Allergies Allergy (Verified 01/08/17 18:49)


   











Review of Systems


Constitutional: ABSENT: chills, fever(s), headache(s), weight gain, weight loss


Eyes: ABSENT: visual disturbances


Ears: ABSENT: hearing changes


Cardiovascular: ABSENT: chest pain, dyspnea on exertion, edema, orthropnea, 

palpitations


Respiratory: ABSENT: cough, hemoptysis


Gastrointestinal: ABSENT: abdominal pain, constipation, diarrhea, hematemesis, 

hematochezia, nausea, vomiting


Genitourinary: ABSENT: dysuria, hematuria


Musculoskeletal: ABSENT: joint swelling


Integumentary: ABSENT: rash, wounds


Neurological: ABSENT: abnormal gait, abnormal speech, confusion, dizziness, 

focal weakness, syncope


Psychiatric: ABSENT: anxiety, depression, homidical ideation, suicidal ideation


Endocrine: ABSENT: cold intolerance, heat intolerance, polydipsia, polyuria


Hematologic/Lymphatic: ABSENT: easy bleeding, easy bruising





Physical Exam


Vital Signs: 


                                 Intake & Output











 03/12/19 03/13/19 03/14/19





 06:59 06:59 06:59


 


Weight   53.1 kg











General appearance: PRESENT: no acute distress, well-developed, well-nourished


Head exam: PRESENT: atraumatic, normocephalic


Eye exam: PRESENT: conjunctiva pink, EOMI, PERRLA.  ABSENT: scleral icterus


Ear exam: PRESENT: normal external ear exam


Mouth exam: PRESENT: moist, tongue midline


Neck exam: ABSENT: carotid bruit, JVD, lymphadenopathy, thyromegaly


Respiratory exam: PRESENT: clear to auscultation tomasa.  ABSENT: rales, rhonchi, 

wheezes


Cardiovascular exam: PRESENT: RRR.  ABSENT: diastolic murmur, rubs, systolic 

murmur


Pulses: PRESENT: normal dorsalis pedis pul


Vascular exam: PRESENT: normal capillary refill


GI/Abdominal exam: PRESENT: normal bowel sounds, soft.  ABSENT: distended, g

uarding, mass, organolmegaly, rebound, tenderness


Rectal exam: PRESENT: deferred


Extremities exam: PRESENT: full ROM.  ABSENT: calf tenderness, clubbing, pedal 

edema


Neurological exam: PRESENT: alert, awake, oriented to person, oriented to place,

oriented to time, oriented to situation, CN II-XII grossly intact.  ABSENT: mo

tor sensory deficit


Psychiatric exam: PRESENT: appropriate affect, normal mood.  ABSENT: homicidal 

ideation, suicidal ideation


Skin exam: PRESENT: dry, intact, warm.  ABSENT: cyanosis, rash





Assessment & Plan





- Diagnosis


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


Is this a current diagnosis for this admission?: Yes   


Plan: 


Status post Angivac debridement.


We will continue Ancef until February 15, 2019








(2) Septic pulmonary embolism


Is this a current diagnosis for this admission?: Yes   


Plan: 


Continue the same antibiotics








(3) IV heroin addiction


Is this a current diagnosis for this admission?: Yes   


Plan: 


Patient counseled and encouraged to stay clean.








- Inpatient Certification


Medical Necessity: Need Close Monitoring Due to Risk of Patient Decompensation, 

Need for IV Antibiotics

## 2019-03-14 LAB
ADD MANUAL DIFF: NO
ANION GAP SERPL CALC-SCNC: 13 MMOL/L (ref 5–19)
BASOPHILS # BLD AUTO: 0.3 10^3/UL (ref 0–0.2)
BASOPHILS NFR BLD AUTO: 2 % (ref 0–2)
BUN SERPL-MCNC: 6 MG/DL (ref 7–20)
CALCIUM: 9.7 MG/DL (ref 8.4–10.2)
CHLORIDE SERPL-SCNC: 104 MMOL/L (ref 98–107)
CO2 SERPL-SCNC: 24 MMOL/L (ref 22–30)
EOSINOPHIL # BLD AUTO: 0.3 10^3/UL (ref 0–0.6)
EOSINOPHIL NFR BLD AUTO: 2.1 % (ref 0–6)
ERYTHROCYTE [DISTWIDTH] IN BLOOD BY AUTOMATED COUNT: 16.7 % (ref 11.5–14)
GLUCOSE SERPL-MCNC: 99 MG/DL (ref 75–110)
HCT VFR BLD CALC: 27.4 % (ref 37.9–51)
HGB BLD-MCNC: 9.2 G/DL (ref 13.5–17)
LYMPHOCYTES # BLD AUTO: 3.9 10^3/UL (ref 0.5–4.7)
LYMPHOCYTES NFR BLD AUTO: 30.1 % (ref 13–45)
MCH RBC QN AUTO: 28.4 PG (ref 27–33.4)
MCHC RBC AUTO-ENTMCNC: 33.5 G/DL (ref 32–36)
MCV RBC AUTO: 85 FL (ref 80–97)
MONOCYTES # BLD AUTO: 1.3 10^3/UL (ref 0.1–1.4)
MONOCYTES NFR BLD AUTO: 10 % (ref 3–13)
NEUTROPHILS # BLD AUTO: 7.1 10^3/UL (ref 1.7–8.2)
NEUTS SEG NFR BLD AUTO: 55.8 % (ref 42–78)
PLATELET # BLD: 304 10^3/UL (ref 150–450)
POTASSIUM SERPL-SCNC: 4.3 MMOL/L (ref 3.6–5)
RBC # BLD AUTO: 3.24 10^6/UL (ref 4.35–5.55)
SODIUM SERPL-SCNC: 140.8 MMOL/L (ref 137–145)
TOTAL CELLS COUNTED % (AUTO): 100 %
WBC # BLD AUTO: 12.8 10^3/UL (ref 4–10.5)

## 2019-03-14 RX ADMIN — CEFAZOLIN SODIUM SCH MLS/HR: 2 SOLUTION INTRAVENOUS at 18:32

## 2019-03-14 RX ADMIN — HYDROMORPHONE HYDROCHLORIDE PRN MG: 2 TABLET ORAL at 23:15

## 2019-03-14 RX ADMIN — QUETIAPINE FUMARATE SCH MG: 100 TABLET, FILM COATED ORAL at 18:23

## 2019-03-14 RX ADMIN — FERROUS SULFATE TAB 325 MG (65 MG ELEMENTAL FE) SCH MG: 325 (65 FE) TAB at 11:42

## 2019-03-14 RX ADMIN — CEFAZOLIN SODIUM SCH MLS/HR: 2 SOLUTION INTRAVENOUS at 05:52

## 2019-03-14 RX ADMIN — BUSPIRONE HYDROCHLORIDE SCH MG: 10 TABLET ORAL at 18:25

## 2019-03-14 RX ADMIN — BUSPIRONE HYDROCHLORIDE SCH MG: 10 TABLET ORAL at 11:41

## 2019-03-14 RX ADMIN — CEFAZOLIN SODIUM SCH MLS/HR: 2 SOLUTION INTRAVENOUS at 11:40

## 2019-03-14 RX ADMIN — HYDROMORPHONE HYDROCHLORIDE PRN MG: 2 TABLET ORAL at 18:41

## 2019-03-14 RX ADMIN — CEFAZOLIN SODIUM SCH MLS/HR: 2 SOLUTION INTRAVENOUS at 00:07

## 2019-03-14 RX ADMIN — METOPROLOL TARTRATE SCH MG: 50 TABLET, FILM COATED ORAL at 11:41

## 2019-03-14 RX ADMIN — HYDROMORPHONE HYDROCHLORIDE PRN MG: 2 TABLET ORAL at 00:07

## 2019-03-14 RX ADMIN — Medication SCH: at 21:15

## 2019-03-14 RX ADMIN — FONDAPARINUX SODIUM SCH: 2.5 INJECTION, SOLUTION SUBCUTANEOUS at 08:00

## 2019-03-14 RX ADMIN — HYDROMORPHONE HYDROCHLORIDE PRN MG: 2 TABLET ORAL at 11:51

## 2019-03-14 RX ADMIN — HYDROMORPHONE HYDROCHLORIDE PRN MG: 2 TABLET ORAL at 05:52

## 2019-03-14 RX ADMIN — CEFAZOLIN SODIUM SCH MLS/HR: 2 SOLUTION INTRAVENOUS at 23:15

## 2019-03-14 RX ADMIN — ASPIRIN 325 MG ORAL TABLET SCH MG: 325 PILL ORAL at 11:42

## 2019-03-14 RX ADMIN — METOPROLOL TARTRATE SCH: 50 TABLET, FILM COATED ORAL at 18:32

## 2019-03-14 RX ADMIN — QUETIAPINE FUMARATE SCH MG: 100 TABLET, FILM COATED ORAL at 11:43

## 2019-03-14 NOTE — PDOC PROGRESS REPORT
Subjective


Progress Note for:: 03/14/19


Subjective:: 


MARCELLA BUCHANAN is a 18 year old  male with past medical history of 

depression IV drug abuse with methamphetamine and heroin who was admitted on 

January 29 with a diagnosis of infective endocarditis and septic pulmonary 

emboli.  He was subsequently transferred to [facility] where he 

underwent right-sided cath with Angiovac debridement x2.  He is not a candidate 

for surgical valve repair.  He return to [facility] 3/13/2019 to complete his IV 

antibiotic therapy.





The patient was seen on afternoon rounds.  He was found resting in bed 

comfortably on room air.  He was sleeping soundly when I entered the room and it

took several attempts to wake him.  He reports that he feels fine and wants to 

be left alone.


He does deny fever, chills, chest pain, palpitations, dyspnea, abdominal pain, 

nausea.  He denies discomfort or swelling to his right upper extremity; 

acknowledges that the nursing staff is having difficulty with his PICC line and 

is agreeable to insertion of PIV to complete his antibiotic therapy.


He has no other questions or concerns at this time.


No other concerns per nursing.





Reason For Visit: 


INFECTIVE ENDOCARDITIS








Physical Exam


Vital Signs: 


                                        











Temp Pulse Resp BP Pulse Ox


 


 98.3 F   92   19   105/64   99 


 


 03/14/19 15:11  03/14/19 15:11  03/14/19 15:11  03/14/19 15:11  03/14/19 15:11








                                 Intake & Output











 03/13/19 03/14/19 03/15/19





 06:59 06:59 06:59


 


Intake Total  150 368


 


Balance  150 368


 


Weight  54.2 kg 











General appearance: PRESENT: no acute distress, disheveled, well-developed, 

well-nourished - Overweight


Head exam: PRESENT: atraumatic, normocephalic


Eye exam: PRESENT: conjunctiva pink, EOMI, PERRLA.  ABSENT: scleral icterus


Mouth exam: PRESENT: moist, tongue midline


Respiratory exam: PRESENT: clear to auscultation tomasa, symmetrical, unlabored.  

ABSENT: rales, rhonchi, wheezes


Cardiovascular exam: PRESENT: RRR.  ABSENT: diastolic murmur, rubs, systolic 

murmur


Vascular exam: PRESENT: normal capillary refill


Rectal exam: PRESENT: deferred


Extremities exam: PRESENT: full ROM.  ABSENT: calf tenderness, clubbing, pedal 

edema


Neurological exam: PRESENT: alert, awake, oriented to person, oriented to place,

oriented to time, oriented to situation, CN II-XII grossly intact.  ABSENT: 

motor sensory deficit


Psychiatric exam: PRESENT: appropriate affect, flat affect, normal mood.  

ABSENT: homicidal ideation, suicidal ideation


Skin exam: PRESENT: dry, intact, warm, other - Diaphoretic.  ABSENT: cyanosis, 

rash





Results


Laboratory Results: 


                                        





                                 03/14/19 09:07 





                                 03/14/19 05:00 





                                        











  03/14/19 03/14/19 03/14/19





  05:00 05:00 09:07


 


WBC  Cancelled   12.8 H


 


RBC  Cancelled   3.24 L


 


Hgb  Cancelled   9.2 L


 


Hct  Cancelled   27.4 L


 


MCV  Cancelled   85


 


MCH  Cancelled   28.4


 


MCHC  Cancelled   33.5


 


RDW  Cancelled   16.7 H


 


Plt Count  Cancelled   304


 


Seg Neutrophils %  Cancelled   55.8


 


Lymphocytes %  Cancelled   30.1


 


Monocytes %  Cancelled   10.0


 


Eosinophils %  Cancelled   2.1


 


Basophils %  Cancelled   2.0


 


Absolute Neutrophils  Cancelled   7.1


 


Absolute Lymphocytes  Cancelled   3.9


 


Absolute Monocytes  Cancelled   1.3


 


Absolute Eosinophils  Cancelled   0.3


 


Absolute Basophils  Cancelled   0.3 H


 


Sodium   140.8 


 


Potassium   4.3 


 


Chloride   104 


 


Carbon Dioxide   24 


 


Anion Gap   13 


 


BUN   6 L 


 


Creatinine   0.43 L 


 


Est GFR ( Amer)   > 60 


 


Est GFR (Non-Af Amer)   > 60 


 


Glucose   99 


 


Calcium   9.7 














Assessment & Plan





- Diagnosis


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


Is this a current diagnosis for this admission?: Yes   


Plan: 


Status post angiovac debridement at [facility].


[facility].


Unable to locate transfer summary or ID notes; have requested nursing to obtain 

new copies.


Patient is diaphoretic, low-grade temp, with leukocytosis today.


Repeat blood cultures obtained.





PICC line is discontinued as nursing is unable to obtain blood or flush.


Obtain PIV access.


Continue Ancef 2 g every 6 hours through tomorrow.


Patient will require infectious disease and cardiovascular surgery follow-up 

post discharge.








(2) Septic pulmonary embolism


Is this a current diagnosis for this admission?: Yes   


Plan: 


Patient is now maintaining oxygen saturations on room air.


He denies dyspnea, orthopnea, cough, chest pain.


Continue antibiotic regimen as above.








(3) IV drug abuse


Is this a current diagnosis for this admission?: Yes   


Plan: 


Patient with a long-standing history of IV drug abuse.


Lifestyle modifications are encouraged.


Patient declined mental health consultation.








(4) Leukocytosis


Is this a current diagnosis for this admission?: Yes   


Plan: 


He is withdrawn today and noted to have Temp 100, heart rate 106, and 

diaphoresis with WBC 12.8 this morning.


It is unclear if this has been his recent clinical status related to #1 or is an

indication of new or worsening problem.


Have requested records from [facility] be re-faxed.


Repeat blood cultures.





Antibiotics as above.








- Time


Time Spent with patient: 15-24 minutes


Medications reviewed and adjusted accordingly: Yes


Anticipated discharge: Home


Within: within 48 hours





- Inpatient Certification


Based on my medical assessment, after consideration of the patient's comorbidit

ies, presenting symptoms, or acuity I expect that the services needed warrant 

INPATIENT care.: Yes


I certify that my determination is in accordance with my understanding of Me

tracey's requirements for reasonable and necessary INPATIENT services [42 CFR 

412.3e].: Yes


Medical Necessity: Need for IV Antibiotics

## 2019-03-15 LAB
APPEARANCE UR: CLEAR
APTT PPP: YELLOW S
BILIRUB UR QL STRIP: NEGATIVE
ERYTHROCYTE [DISTWIDTH] IN BLOOD BY AUTOMATED COUNT: 16.9 % (ref 11.5–14)
GLUCOSE UR STRIP-MCNC: NEGATIVE MG/DL
HCT VFR BLD CALC: 29.3 % (ref 37.9–51)
HGB BLD-MCNC: 9.9 G/DL (ref 13.5–17)
KETONES UR STRIP-MCNC: NEGATIVE MG/DL
MCH RBC QN AUTO: 28.6 PG (ref 27–33.4)
MCHC RBC AUTO-ENTMCNC: 33.8 G/DL (ref 32–36)
MCV RBC AUTO: 85 FL (ref 80–97)
NITRITE UR QL STRIP: NEGATIVE
PH UR STRIP: 5 [PH] (ref 5–9)
PLATELET # BLD: 330 10^3/UL (ref 150–450)
PROT UR STRIP-MCNC: NEGATIVE MG/DL
RBC # BLD AUTO: 3.46 10^6/UL (ref 4.35–5.55)
SP GR UR STRIP: 1.01
UROBILINOGEN UR-MCNC: NEGATIVE MG/DL (ref ?–2)
WBC # BLD AUTO: 14.5 10^3/UL (ref 4–10.5)

## 2019-03-15 RX ADMIN — METOPROLOL TARTRATE SCH: 50 TABLET, FILM COATED ORAL at 18:27

## 2019-03-15 RX ADMIN — FERROUS SULFATE TAB 325 MG (65 MG ELEMENTAL FE) SCH MG: 325 (65 FE) TAB at 11:15

## 2019-03-15 RX ADMIN — BUSPIRONE HYDROCHLORIDE SCH MG: 10 TABLET ORAL at 18:20

## 2019-03-15 RX ADMIN — ASPIRIN 325 MG ORAL TABLET SCH MG: 325 PILL ORAL at 11:13

## 2019-03-15 RX ADMIN — Medication SCH: at 05:42

## 2019-03-15 RX ADMIN — HYDROMORPHONE HYDROCHLORIDE PRN MG: 2 TABLET ORAL at 07:46

## 2019-03-15 RX ADMIN — CEFAZOLIN SODIUM SCH MLS/HR: 2 SOLUTION INTRAVENOUS at 05:56

## 2019-03-15 RX ADMIN — CEFAZOLIN SODIUM SCH MLS/HR: 2 SOLUTION INTRAVENOUS at 13:27

## 2019-03-15 RX ADMIN — CEFAZOLIN SODIUM SCH MLS/HR: 2 SOLUTION INTRAVENOUS at 18:20

## 2019-03-15 RX ADMIN — VANCOMYCIN HYDROCHLORIDE SCH MLS/HR: 1 INJECTION, POWDER, LYOPHILIZED, FOR SOLUTION INTRAVENOUS at 23:07

## 2019-03-15 RX ADMIN — METOPROLOL TARTRATE SCH MG: 50 TABLET, FILM COATED ORAL at 11:15

## 2019-03-15 RX ADMIN — SODIUM CHLORIDE PRN MLS/HR: 9 INJECTION, SOLUTION INTRAVENOUS at 22:08

## 2019-03-15 RX ADMIN — Medication SCH: at 16:15

## 2019-03-15 RX ADMIN — QUETIAPINE FUMARATE SCH MG: 100 TABLET, FILM COATED ORAL at 18:21

## 2019-03-15 RX ADMIN — Medication SCH: at 21:19

## 2019-03-15 RX ADMIN — HYDROMORPHONE HYDROCHLORIDE PRN MG: 2 TABLET ORAL at 21:23

## 2019-03-15 RX ADMIN — BUSPIRONE HYDROCHLORIDE SCH MG: 10 TABLET ORAL at 11:13

## 2019-03-15 RX ADMIN — HYDROMORPHONE HYDROCHLORIDE PRN MG: 2 TABLET ORAL at 03:41

## 2019-03-15 RX ADMIN — HYDROMORPHONE HYDROCHLORIDE PRN MG: 2 TABLET ORAL at 15:23

## 2019-03-15 RX ADMIN — QUETIAPINE FUMARATE SCH MG: 100 TABLET, FILM COATED ORAL at 11:13

## 2019-03-15 RX ADMIN — FONDAPARINUX SODIUM SCH: 2.5 INJECTION, SOLUTION SUBCUTANEOUS at 07:45

## 2019-03-15 NOTE — PROGRESS NOTE
Provider Note


Provider Note: 


ID Consult Note





Asked to review patient's chart by Jeannette Mayfield NP. Pt not seen or examined. 

Mr. Cuevas is a 18 year old man with PMH including IVDU and MSSA tricuspid valve 

endocarditis with septic pulmonary emboli, for which he was transferred to [facility] 

and underwent Angiovac tricuspid valve debridement; course was also complicated 

by PTX requiring chest tube until resolution. He was seen by [facility] while at [facility]

for evaluation of continued mildly elevated WBC and intermittent fevers. There 

was no other soure of infection appreciated. Leukocytosis and fevers appeared to

be most likely secondary to his known disease process. He was admitted on 

3/13/19 to [place] to continue his treatment course.





He has been on room air. He has temperature up to 100.8 F, shivering and sweats 

and complains of feeling unwell. On exam, his lungs are clear to auscultation. 

His labs include an elevated WBC count of 14.5k. AP CXR was read as showing 

cavitary pneumonia in LLL. He had a CTA of the chest that was read as showing 

multiple b/l lower lobe predominant nodules and cavitary masses of b/l lungs and

small associated pleural efusions and also in the pulmonary arteries there was 

segmental thrombus present in the L lower lobe, R middle lobe and RLL adjacent 

to the cavitary lesions. The projected end date for his 6 weeks of IV 

antibiotics for endocarditis is essentially complete at this point.





Impression/Recommendations


MSSA tricuspid valve IE, s/p debridement and 6 weeks of IV cefazolin


Sequelae of septic pulmonary emboli with lung abscesses, due to the same


- MSSA bacteremia has the potential to seed areas outside those originally 

involved, such as the spine, and if he has back pain that is out of the norm or 

point tenderness on palpation/percussion of his bony spine, then he would need 

an MRI to evaluate further. Sometimes vertebral discitis/osteomyelitis can 

declare itself while on therapy or after a patient has already completed 

treatment. In absence of signs/symptoms compatible with osteomyelitis/discitis, 

I do not think imaging the back would be indicated, however. It is just 

something that needs to be kept in mind. 


- In absence of localizing signs or symptoms that point to a difficult to treat 

or deep seated focus of infection elsewhere, I do not think that the patient 

needs IV antibiotics for longer than his projected treatment course. He has been

adequately treated for endocarditis. 


- The question at this point is what to do about the apperance of his lungs in 

conjunction with his constitutional signs/symptoms. He appears to have lung 

cavitary lesions with air fluid levels compatible with lung abscesses. If there 

are no localizing findings on history and exam to suggest foci of disease 

elsewhere, it may be that he requires a longer course of PO antibiotics for 

apparent lung abscesses. In this case, removing PICC line and transitioning the 

patient to PO Keflex 500 mg QID or PO Augmentin 500/125 mg TID might be the most

reasonable course of action. The duration of treatment is not pre-determined for

lung abscesses. Can take several weeks (another 4-8 weeks?) to get to a point 

where there are small stable lesions, at which point treatment can be stopped. 





Gonzalo Blackwell MD


[facility]


pager [phone]

## 2019-03-15 NOTE — RADIOLOGY REPORT (SQ)
EXAM DESCRIPTION:  CHEST SINGLE VIEW



COMPLETED DATE/TIME:  3/15/2019 10:52 am



REASON FOR STUDY:  fever, tachycardia, hypoxia



COMPARISON:  1/31/2019



EXAM PARAMETERS:  NUMBER OF VIEWS: One view.

TECHNIQUE: Single frontal radiographic view of the chest acquired.

RADIATION DOSE: NA

LIMITATIONS: None.



FINDINGS:  LUNGS AND PLEURA: Improved aeration in both lungs since the prior.  Subcentimeter cavitary
 lesion overlying lateral left heart border.  Small left pleural effusion.

MEDIASTINUM AND HILAR STRUCTURES: No masses.  Contour normal.

HEART AND VASCULAR STRUCTURES: Heart normal in size.  Normal vasculature.

BONES: No acute findings.

HARDWARE: None in the chest.

OTHER: Right PICC line with tip overlying SVC.



IMPRESSION:  Residual versus recurrent cavitary pneumonia left lower lobe.



TECHNICAL DOCUMENTATION:  JOB ID:  [id number]

 2011 [employer]- All Rights Reserved



Reading location - IP/workstation name: [email or web address]

## 2019-03-15 NOTE — PDOC PROGRESS REPORT
Subjective


Progress Note for:: 03/15/19


Subjective:: 


MARCELLA BUCHANAN is a 18 year old  male with past medical history of 

depression IV drug abuse with methamphetamine and heroin who was admitted on 

January 29 with a diagnosis of infective endocarditis and septic pulmonary 

emboli.  He was subsequently transferred to [facility] where he 

underwent right-sided cath with Angiovac debridement x2.  He is not a candidate 

for surgical valve repair.  He return to [place] 3/13/2019 to complete his IV 

antibiotic therapy.





The patient was seen on morning rounds.  He was found resting in bed comfortably

on room air.  The patient reports nightly sweats; through his clothing and 

sheets followed by dramatic shivering.  Nursing does describe observed rigors 

this morning though patient was found to only have a temperature of 99.2.  The 

patient denies new symptoms, although with flat affect and not very engaging 

disposition.  He specifically denies cellulitis to his groin (previous cardiac 

cath insertion points), dysuria, abdominal pain, nausea vomiting, chest pain, 

dyspnea, and cough.  He states that he just feels unwell.


He has no other questions or concerns at this time.





Nursing reports they are concerned that he is developing a new acute illness, 

though without specific symptoms at this time.


Reason For Visit: 


INFECTIVE ENDOCARDITIS








Physical Exam


Vital Signs: 


                                        











Temp Pulse Resp BP Pulse Ox


 


 100.8 F H  117 H  16   91/49 L  99 


 


 03/15/19 12:09  03/15/19 12:09  03/15/19 12:09  03/15/19 12:09  03/15/19 12:09








                                 Intake & Output











 03/14/19 03/15/19 03/16/19





 06:59 06:59 06:59


 


Intake Total 150 1362 50


 


Balance 150 1362 50


 


Weight 54.2 kg 54 kg 











General appearance: PRESENT: no acute distress, disheveled, well-developed, 

well-nourished


Head exam: PRESENT: atraumatic, normocephalic


Eye exam: PRESENT: conjunctiva pink, EOMI, PERRLA.  ABSENT: scleral icterus


Ear exam: PRESENT: normal external ear exam


Mouth exam: PRESENT: moist, tongue midline


Neck exam: ABSENT: carotid bruit, JVD, lymphadenopathy, thyromegaly


Respiratory exam: PRESENT: clear to auscultation tomasa, symmetrical, unlabored.  

ABSENT: rales, rhonchi, wheezes


Cardiovascular exam: PRESENT: RRR, tachycardia.  ABSENT: diastolic murmur, rubs,

systolic murmur


Pulses: PRESENT: normal dorsalis pedis pul


Vascular exam: PRESENT: normal capillary refill


GI/Abdominal exam: PRESENT: normal bowel sounds, soft.  ABSENT: distended, 

guarding, mass, organolmegaly, rebound, tenderness


Rectal exam: PRESENT: deferred


Extremities exam: PRESENT: full ROM.  ABSENT: calf tenderness, clubbing, pedal 

edema


Neurological exam: PRESENT: alert, awake, oriented to person, oriented to place,

oriented to time, oriented to situation, CN II-XII grossly intact.  ABSENT: 

motor sensory deficit


Psychiatric exam: PRESENT: flat affect, normal mood.  ABSENT: homicidal 

ideation, suicidal ideation


Skin exam: PRESENT: dry, intact, warm.  ABSENT: cyanosis, rash





Results


Laboratory Results: 


                                        





                                 03/15/19 06:30 





                                 03/14/19 05:00 





                                        











  03/15/19 03/15/19





  06:30 11:20


 


WBC  14.5 H 


 


RBC  3.46 L 


 


Hgb  9.9 L 


 


Hct  29.3 L 


 


MCV  85 


 


MCH  28.6 


 


MCHC  33.8 


 


RDW  16.9 H 


 


Plt Count  330 


 


Urine Color   YELLOW


 


Urine Appearance   CLEAR


 


Urine pH   5.0


 


Ur Specific Gravity   1.012


 


Urine Protein   NEGATIVE


 


Urine Glucose (UA)   NEGATIVE


 


Urine Ketones   NEGATIVE


 


Urine Blood   MODERATE H


 


Urine Nitrite   NEGATIVE


 


Ur Leukocyte Esterase   NEGATIVE


 


Urine WBC (Auto)   1


 


Urine RBC (Auto)   1











Impressions: 


                                        





Chest X-Ray  03/15/19 00:00


IMPRESSION:  Residual versus recurrent cavitary pneumonia left lower lobe.


 














Assessment & Plan





- Diagnosis


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


Is this a current diagnosis for this admission?: Yes   


Plan: 


Status post angiovac debridement at [facility].


Returned to [facility] to complete course of abx treatment.


Unable to locate transfer summary or ID notes; have requested nursing to obtain 

new copies.


Patient is diaphoretic, low-grade temp, with leukocytosis today.


Repeat blood cultures pending.





Continue Ancef 2 g every 6 hours through tomorrow.


Have asked Infectious Disease to comment prior to discontinuing antibiotics 

secondary to patient's development of worsening leukocytosis accompanied with 

low-grade temperature, rigors, and diaphoresis.





Patient will require infectious disease and cardiovascular surgery follow-up 

post discharge.








(2) Septic pulmonary embolism


Is this a current diagnosis for this admission?: Yes   


Plan: 


Patient is now maintaining oxygen saturations on room air.


He denies dyspnea, orthopnea, cough, chest pain.


Continue antibiotic regimen as above.








(3) IV drug abuse


Is this a current diagnosis for this admission?: Yes   


Plan: 


Patient with a long-standing history of IV drug abuse.


Lifestyle modifications are encouraged.


Patient declined mental health consultation.








(4) Leukocytosis


Is this a current diagnosis for this admission?: Yes   


Plan: 


He is withdrawn today and noted to have Temp 100.6, heart rate 117 and 

diaphoresis with WBC 14.5 this morning.


It is unclear if this has been his recent clinical status related to #1 or is an

indication of new or worsening problem.


Have requested records from [employer] be re-faxed.


Repeat blood cultures pending.


Urinalysis is negative.


Chest x-ray demonstrates recurrent versus residual LLL cavitary PNA





Antibiotics as above.


ID is consulted; appreciate Dr. Blackwell's assistance.








- Time


Time Spent with patient: 15-24 minutes


Medications reviewed and adjusted accordingly: Yes


Anticipated discharge: Home


Within: within 48 hours

## 2019-03-16 LAB
ERYTHROCYTE [DISTWIDTH] IN BLOOD BY AUTOMATED COUNT: 16.8 % (ref 11.5–14)
HCT VFR BLD CALC: 23.5 % (ref 37.9–51)
HGB BLD-MCNC: 7.9 G/DL (ref 13.5–17)
MCH RBC QN AUTO: 28.4 PG (ref 27–33.4)
MCHC RBC AUTO-ENTMCNC: 33.5 G/DL (ref 32–36)
MCV RBC AUTO: 85 FL (ref 80–97)
PLATELET # BLD: 243 10^3/UL (ref 150–450)
RBC # BLD AUTO: 2.77 10^6/UL (ref 4.35–5.55)
WBC # BLD AUTO: 14.4 10^3/UL (ref 4–10.5)

## 2019-03-16 PROCEDURE — 30233N1 TRANSFUSION OF NONAUTOLOGOUS RED BLOOD CELLS INTO PERIPHERAL VEIN, PERCUTANEOUS APPROACH: ICD-10-PCS | Performed by: NURSE PRACTITIONER

## 2019-03-16 RX ADMIN — ASPIRIN 325 MG ORAL TABLET SCH MG: 325 PILL ORAL at 10:01

## 2019-03-16 RX ADMIN — CEFAZOLIN SODIUM SCH MLS/HR: 2 SOLUTION INTRAVENOUS at 05:22

## 2019-03-16 RX ADMIN — HYDROMORPHONE HYDROCHLORIDE PRN MG: 2 TABLET ORAL at 09:59

## 2019-03-16 RX ADMIN — FONDAPARINUX SODIUM SCH: 2.5 INJECTION, SOLUTION SUBCUTANEOUS at 09:58

## 2019-03-16 RX ADMIN — Medication SCH: at 13:22

## 2019-03-16 RX ADMIN — ACETAMINOPHEN PRN MG: 325 TABLET ORAL at 10:43

## 2019-03-16 RX ADMIN — CEFAZOLIN SODIUM SCH MLS/HR: 2 SOLUTION INTRAVENOUS at 12:12

## 2019-03-16 RX ADMIN — SODIUM CHLORIDE PRN MLS/HR: 9 INJECTION, SOLUTION INTRAVENOUS at 15:57

## 2019-03-16 RX ADMIN — HYDROMORPHONE HYDROCHLORIDE PRN MG: 2 TABLET ORAL at 23:10

## 2019-03-16 RX ADMIN — QUETIAPINE FUMARATE SCH MG: 100 TABLET, FILM COATED ORAL at 17:55

## 2019-03-16 RX ADMIN — METOPROLOL TARTRATE SCH MG: 50 TABLET, FILM COATED ORAL at 10:02

## 2019-03-16 RX ADMIN — VANCOMYCIN HYDROCHLORIDE SCH MLS/HR: 1 INJECTION, POWDER, LYOPHILIZED, FOR SOLUTION INTRAVENOUS at 07:04

## 2019-03-16 RX ADMIN — FERROUS SULFATE TAB 325 MG (65 MG ELEMENTAL FE) SCH MG: 325 (65 FE) TAB at 09:59

## 2019-03-16 RX ADMIN — CEFAZOLIN SODIUM SCH MLS/HR: 2 SOLUTION INTRAVENOUS at 18:22

## 2019-03-16 RX ADMIN — SODIUM CHLORIDE PRN MLS/HR: 9 INJECTION, SOLUTION INTRAVENOUS at 04:51

## 2019-03-16 RX ADMIN — Medication SCH: at 23:26

## 2019-03-16 RX ADMIN — VANCOMYCIN HYDROCHLORIDE SCH MLS/HR: 1 INJECTION, POWDER, LYOPHILIZED, FOR SOLUTION INTRAVENOUS at 13:24

## 2019-03-16 RX ADMIN — CEFAZOLIN SODIUM SCH MLS/HR: 2 SOLUTION INTRAVENOUS at 01:50

## 2019-03-16 RX ADMIN — BUSPIRONE HYDROCHLORIDE SCH MG: 10 TABLET ORAL at 10:02

## 2019-03-16 RX ADMIN — VANCOMYCIN HYDROCHLORIDE SCH MLS/HR: 1 INJECTION, POWDER, LYOPHILIZED, FOR SOLUTION INTRAVENOUS at 23:11

## 2019-03-16 RX ADMIN — QUETIAPINE FUMARATE SCH MG: 100 TABLET, FILM COATED ORAL at 10:01

## 2019-03-16 RX ADMIN — HYDROMORPHONE HYDROCHLORIDE PRN MG: 2 TABLET ORAL at 03:27

## 2019-03-16 RX ADMIN — Medication SCH: at 05:19

## 2019-03-16 RX ADMIN — BUSPIRONE HYDROCHLORIDE SCH MG: 10 TABLET ORAL at 17:55

## 2019-03-16 NOTE — XCELERA REPORT
[address] [address]

                               Tel: [phone]

                               Fax: [phone]



                      Transthoracic Echocardiogram Report

_______________________________________________________________________________



Name: MARCELLA BUCHANAN

MRN: [id number]                           Age: 18 yrs

Gender: Male                              : 2000

Patient Status: Inpatient                 Patient Location: [facility]A

Account #: [id number]

Study Date: 2019 08:38 AM

Accession #: [id number]

_______________________________________________________________________________



Height: 60 in        Weight: 117 lb        BSA: 1.5 m2

_______________________________________________________________________________



Reason For Study: Endocarditis (known veg TV)





Ordering Physician: DIEUDONNE AMARAL

Performed By: Delgado Wyman



_______________________________________________________________________________



Interpretation Summary

Study quality fair with some suboptimal images.

Lack of contrast limits evaluation for intracardiaac mass/ thrombus.

LV aappears hyperdynamic in a state of tachycardia with LVEF visually

estimated at 50-55%.

RV visually appears mildly dilated with RV systolic function appearing normal.

AV appears to open well.

Independently mobile echodensity noted atttached to TV leaflets consistent

with known vegeetation with severe tricuspid regurgitation.

RV to RA gradient noted at 36.3 mm Hg.

There is no pericardial effusion.

The aortic root is normal size.

IVC normal sized.



MMode/2D Measurements & Calculations

RVDd: 3.1 cm      LVIDd: 2.6 cm       FS: -15.6 %        Ao root diam: 2.8 cm

IVSd: 2.8 cm      LVIDs: 3.0 cm       EDV(Teich): 24.1 mlAo root area:

                  LVPWd: 0.74 cm      ESV(Teich): 34.4 ml

                                                         6.2 cm2

                                      EF(Teich): -42.9 % LA dimension: 2.3 cm

        _______________________________________________________________

LVOT diam: 2.1 cm LVLd ap4: 7.6 cm    SV(MOD-sp4):

LVOT area:        EDV(MOD-sp4):       48.0 ml

                  87.0 ml

3.3 cm2           LVLs ap4: 6.8 cm

                  ESV(MOD-sp4):

                  39.0 ml



                  EF(MOD-sp4): 55.2 %



Doppler Measurements & Calculations

MV E max rachael:      MV P1/2t max rachael:     Ao V2 max:         LV V1 max P.7 cm/sec        131.4 cm/sec          132.0 cm/sec       4.2 mmHg

MV A max rachael:      MV P1/2t: 59.8 msec   Ao max P.0 mmHgLV V1 max:

50.3 cm/sec        MVA(P1/2t): 3.7 cm2   ANNA(V,D): 2.6 cm2  102.7 cm/sec

MV E/A: 2.0        MV dec slope:



                   643.4 cm/sec2

                   MV dec time: 0.14 sec

        _______________________________________________________________

PA V2 max:         TR max rachael:           MV P1/2t-pr_phl:

124.9 cm/sec       285.0 cm/sec          59.8 msec

PA max PG:         TR max P.5 mmHg

6.2 mmHg





Left Ventricle

The left ventricle is hyperdynamic. LV EF is 50-55%.



Right Ventricle

RV to RA gradient was noted at 36.3 mm Hg. The right ventricular systolic

function is normal.



Atria

The left atrial size is normal.



Mitral Valve

MV leaflets not well visualized but appear to open well.



Aortic Valve

AV not well visualized but appears trileaflet. There is a peak gradient of 7

mm of Hg.





Tricuspid Valve

Independently mobile echodensity noted atttached to TV leaflets consistent

with known vegeetation. There is a severe amount of tricuspid regurgitation.



Pulmonic Valve

The pulmonic valve is not well visualized.



Great Vessels

The aortic root is normal size. IVC normal sized.



Effusions

There is no pericardial effusion.





_______________________________________________________________________________

_______________________________________________________________________________



Electronically signed by:      Vince Barksdale      on 2019 10:07 AM



CC: DIEUDONNE AMARAL Sanjay

## 2019-03-16 NOTE — PDOC PROGRESS REPORT
Subjective


Progress Note for:: 03/16/19


Subjective:: 


MARCELLA BUCHANAN is a 18 year old  male with past medical history of 

depression IV drug abuse with methamphetamine and heroin who was admitted on 

January 29 with a diagnosis of infective endocarditis and septic pulmonary 

emboli.  He was subsequently transferred to [facility] where he 

underwent right-sided cath with Angiovac debridement x2.  He is not a candidate 

for surgical valve repair.  He returned to [place] 3/13/2019 to complete his IV 

antibiotic therapy.





The patient was seen on morning rounds.  He was found resting in bed comfortably

on room air.  The patient reports continued fever, chills, and nightly sweats.  

He continues to deny other new symptoms.  He specifically denies chest pain, 

dyspnea, cough, chest wall pain, large joint or back pain, cellulitis to his 

groin (previous cardiac cath insertion points), dysuria, abdominal pain, nausea 

and vomiting.


He is informed of the plan to remove his PICC line, escalate antibiotics and 

obtain repeat echo today.


He has no questions or concerns at this time.


Reason For Visit: 


INFECTIVE ENDOCARDITIS








Physical Exam


Vital Signs: 


                                        











Temp Pulse Resp BP Pulse Ox


 


 100.2 F   103 H  16   96/30 L  97 


 


 03/16/19 12:00  03/16/19 12:00  03/16/19 12:00  03/16/19 12:00  03/16/19 12:00








                                 Intake & Output











 03/15/19 03/16/19 03/17/19





 06:59 06:59 06:59


 


Intake Total 1362 4280 300


 


Balance 1362 4280 300


 


Weight 54 kg 53.1 kg 











General appearance: PRESENT: no acute distress, well-developed, well-nourished


Head exam: PRESENT: atraumatic, normocephalic


Eye exam: PRESENT: conjunctiva pink, EOMI, PERRLA.  ABSENT: scleral icterus


Mouth exam: PRESENT: moist, tongue midline


Neck exam: ABSENT: carotid bruit, JVD, lymphadenopathy, thyromegaly


Respiratory exam: PRESENT: clear to auscultation tomasa, symmetrical, tachypnea, 

unlabored.  ABSENT: rales, rhonchi, wheezes


Cardiovascular exam: PRESENT: RRR, tachycardia - .  ABSENT: diastolic 

murmur, rubs, systolic murmur


Pulses: PRESENT: normal dorsalis pedis pul


Vascular exam: PRESENT: normal capillary refill


GI/Abdominal exam: PRESENT: normal bowel sounds, soft.  ABSENT: distended, 

guarding, mass, organolmegaly, rebound, tenderness


Rectal exam: PRESENT: deferred


Extremities exam: PRESENT: full ROM.  ABSENT: calf tenderness, clubbing, pedal 

edema


Neurological exam: PRESENT: alert, awake, oriented to person, oriented to place,

oriented to time, oriented to situation, CN II-XII grossly intact.  ABSENT: 

motor sensory deficit


Psychiatric exam: PRESENT: flat affect, normal mood.  ABSENT: homicidal 

ideation, suicidal ideation


Skin exam: PRESENT: dry, intact, warm.  ABSENT: cyanosis, rash





Results


Laboratory Results: 


                                        





                                 03/16/19 07:04 





                                 03/14/19 05:00 





                                        











  03/16/19 03/16/19





  06:15 07:04


 


WBC  Cancelled  14.4 H


 


RBC  Cancelled  2.77 L


 


Hgb  Cancelled  7.9 L


 


Hct  Cancelled  23.5 L


 


MCV  Cancelled  85


 


MCH  Cancelled  28.4


 


MCHC  Cancelled  33.5


 


RDW  Cancelled  16.8 H


 


Plt Count  Cancelled  243











Impressions: 


                                        





Chest X-Ray  03/15/19 00:00


IMPRESSION:  Residual versus recurrent cavitary pneumonia left lower lobe.


 














Assessment & Plan





- Diagnosis


(1) Sepsis


Qualifiers: 


   Sepsis type: sepsis due to unspecified organism   Qualified Code(s): A41.9 - 

Sepsis, unspecified organism   


Is this a current diagnosis for this admission?: Yes   


Plan: 


Sepsis 2 criteria, likely due to endocarditis (MSSA), present on arrival, 

evidenced by fever, (100.0 at time of admission; transfer summary from [facility] 

reveals the patient has been running a temperature of 102.1 for the previous 2 

days), tachycardia (), and leukocytosis (WBC 12.8).


Despite continuing the appropriate course of Ancef; the patient clinically 

worsened over the following 2 days with development of diaphoresis, rigors, 

temperature 103.1, tachycardia (), hypotension despite 2 L NS bolus 

(96/30), and worsening leukocytosis (WBC 14.5).


Chest xray revealed recurrent versus resolving left lower lobe cavitary lesion.


Urinalysis was negative for UTI.


Lactic acid 1.0


No evidence of cellulitis.


Blood culture obtained from the PICC line is growing gram-positive cocci in 

clusters and gram-negative rods.


Peripheral blood cultures are pending.


PICC line has been removed; catheter tip is being cultured.





The patient is upgraded to IMCU.


He has provided additional fluid boluses followed by maintenance fluids.


Hgb is trending down; will transfuse 1 unit PRBC.


Continue IV vancomycin.


Infectious disease consulted; will need to contact Dr. Blackwell on Monday to 

discuss his worsening status.


Analgesics and antipyretics as needed.








(2) Infective endocarditis due to MSSA


Is this a current diagnosis for this admission?: Yes   


Plan: 


Status post angiovac debridement x2 at [facility].


Returned to [facility] to complete course of abx treatment.


Repeat blood cultures (3/15/19) show gram (+) cocci in clusters and 'yellow' 

gram (-) rods from PICC line.


Peripheral Blood cultures obtained today.


2D Echo (3/16/19) demonstrates LVEF 50-55%.  Confirmed vegetations attached to 

the tricuspid valve leaflets; severe TV regurgitation.





Continue Ancef 2 g every 6 hours.


Started on Vancomycin 3/15/19.


Have asked Infectious Disease to comment prior to discontinuing antibiotics 

secondary to patient's development of worsening leukocytosis accompanied with 

low-grade temperature, rigors, and diaphoresis.  Appreciate Dr. Blackwell's 

assistance, unfortunately, the patient developed high grade temp and clear 

evidence of sepsis after her chart review/recommendations.





Patient will require infectious disease and cardiovascular surgery follow-up 

post discharge.








(3) Septic pulmonary embolism


Is this a current diagnosis for this admission?: Yes   


Plan: 


Patient is now maintaining oxygen saturations on room air.


He denies dyspnea, orthopnea, cough, chest pain.


CXR shows residual vs recurrent cavitary PNA.


Continue antibiotic regimen as above.








(4) IV drug abuse


Is this a current diagnosis for this admission?: Yes   


Plan: 


Patient with a long-standing history of IV drug abuse.


Lifestyle modifications are encouraged.


Patient declined mental health consultation.








(5) Leukocytosis


Is this a current diagnosis for this admission?: Yes   


Plan: 





Repeat blood cultures (3/15/19) from PICC show gram positive cocci and gram 

negative rods.


Blood culture (3/16/19) from peripheral site are pending.


PICC line tip culture is pending.


Urinalysis is negative.


Chest x-ray demonstrates recurrent versus residual LLL cavitary PNA


2D echo (3/16/19) confirms persistent TV vegitations.





Antibiotics as above.


ID is consulted; appreciate Dr. Blackwell's assistance.











- Time


Time Spent with patient: 35 or more minutes


Medications reviewed and adjusted accordingly: Yes





- Inpatient Certification


Based on my medical assessment, after consideration of the patient's 

comorbidities, presenting symptoms, or acuity I expect that the services needed 

warrant INPATIENT care.: Yes


I certify that my determination is in accordance with my understanding of 

Medicare's requirements for reasonable and necessary INPATIENT services [42 CFR 

412.3e].: Yes


Medical Necessity: Need Close Monitoring Due to Risk of Patient Decompensation, 

Need For IV Fluids, Need For Continuous Telemetry Monitoring, Need for IV 

Antibiotics, Risk of Complication if Not Cared For in Hospital





- Plan Summary


Plan Summary: 





Patient's vital signs, exam, lab work, culture results, and plan of care 

reviewed and developed with Dr. Pal.

## 2019-03-17 RX ADMIN — HYDROMORPHONE HYDROCHLORIDE PRN MG: 2 TABLET ORAL at 23:27

## 2019-03-17 RX ADMIN — HYDROMORPHONE HYDROCHLORIDE PRN MG: 2 TABLET ORAL at 17:27

## 2019-03-17 RX ADMIN — CEFAZOLIN SODIUM SCH MLS/HR: 2 SOLUTION INTRAVENOUS at 01:05

## 2019-03-17 RX ADMIN — BUSPIRONE HYDROCHLORIDE SCH MG: 10 TABLET ORAL at 17:26

## 2019-03-17 RX ADMIN — QUETIAPINE FUMARATE SCH MG: 100 TABLET, FILM COATED ORAL at 09:27

## 2019-03-17 RX ADMIN — VANCOMYCIN HYDROCHLORIDE SCH MLS/HR: 1 INJECTION, POWDER, LYOPHILIZED, FOR SOLUTION INTRAVENOUS at 14:28

## 2019-03-17 RX ADMIN — VANCOMYCIN HYDROCHLORIDE SCH MLS/HR: 1 INJECTION, POWDER, LYOPHILIZED, FOR SOLUTION INTRAVENOUS at 05:17

## 2019-03-17 RX ADMIN — QUETIAPINE FUMARATE SCH MG: 100 TABLET, FILM COATED ORAL at 17:26

## 2019-03-17 RX ADMIN — HYDROMORPHONE HYDROCHLORIDE PRN MG: 2 TABLET ORAL at 05:14

## 2019-03-17 RX ADMIN — SODIUM CHLORIDE PRN MLS/HR: 9 INJECTION, SOLUTION INTRAVENOUS at 23:25

## 2019-03-17 RX ADMIN — FERROUS SULFATE TAB 325 MG (65 MG ELEMENTAL FE) SCH MG: 325 (65 FE) TAB at 09:27

## 2019-03-17 RX ADMIN — ASPIRIN 325 MG ORAL TABLET SCH MG: 325 PILL ORAL at 09:27

## 2019-03-17 RX ADMIN — Medication SCH ML: at 21:07

## 2019-03-17 RX ADMIN — VANCOMYCIN HYDROCHLORIDE SCH MLS/HR: 1 INJECTION, POWDER, LYOPHILIZED, FOR SOLUTION INTRAVENOUS at 21:00

## 2019-03-17 RX ADMIN — Medication SCH ML: at 05:18

## 2019-03-17 RX ADMIN — Medication SCH ML: at 14:28

## 2019-03-17 RX ADMIN — SODIUM CHLORIDE PRN MLS/HR: 9 INJECTION, SOLUTION INTRAVENOUS at 09:29

## 2019-03-17 RX ADMIN — FONDAPARINUX SODIUM SCH: 2.5 INJECTION, SOLUTION SUBCUTANEOUS at 09:09

## 2019-03-17 RX ADMIN — HYDROMORPHONE HYDROCHLORIDE PRN MG: 2 TABLET ORAL at 11:34

## 2019-03-17 RX ADMIN — CEFAZOLIN SODIUM SCH MLS/HR: 2 SOLUTION INTRAVENOUS at 05:18

## 2019-03-17 RX ADMIN — CEFAZOLIN SODIUM SCH MLS/HR: 2 SOLUTION INTRAVENOUS at 11:34

## 2019-03-17 RX ADMIN — BUSPIRONE HYDROCHLORIDE SCH MG: 10 TABLET ORAL at 09:27

## 2019-03-17 RX ADMIN — SODIUM CHLORIDE PRN MLS/HR: 9 INJECTION, SOLUTION INTRAVENOUS at 17:30

## 2019-03-17 RX ADMIN — CEFAZOLIN SODIUM SCH MLS/HR: 2 SOLUTION INTRAVENOUS at 17:26

## 2019-03-17 NOTE — PDOC PROGRESS REPORT
Subjective


Progress Note for:: 03/17/19


Subjective:: 





No adverse events overnight.  No new complaints.  He is been afebrile since 

yesterday.  He is not had much of an appetite.  The nurses were able to convince

him to clean himself up today.


Reason For Visit: 


INFECTIVE ENDOCARDITIS








Physical Exam


Vital Signs: 


                                        











Temp Pulse Resp BP Pulse Ox


 


 98.0 F   95   16   115/68   100 


 


 03/17/19 07:25  03/17/19 07:25  03/17/19 07:25  03/17/19 07:25  03/17/19 07:25








                                 Intake & Output











 03/16/19 03/17/19 03/18/19





 06:59 06:59 06:59


 


Intake Total 4280 4992 750


 


Balance 4280 4992 750


 


Weight 53.1 kg 54.9 kg 











General appearance: PRESENT: no acute distress.  ABSENT: cooperative


Respiratory exam: PRESENT: clear to auscultation tomasa, symmetrical, unlabored.  

ABSENT: accessory muscle use, prolonged expiratory phas, rales, rhonchi, 

tachypnea, wheezes


Cardiovascular exam: PRESENT: RRR, +S1, +S2


Pulses: PRESENT: normal carotid pulses


Vascular exam: PRESENT: normal capillary refill


GI/Abdominal exam: PRESENT: normal bowel sounds, soft.  ABSENT: distended, 

guarding, rebound, tenderness


Extremities exam: ABSENT: clubbing, pedal edema


Musculoskeletal exam: PRESENT: normal inspection.  ABSENT: deformity


Neurological exam: PRESENT: alert, awake, oriented to person, oriented to place,

oriented to time, oriented to situation


Psychiatric exam: PRESENT: flat affect


Skin exam: PRESENT: dry, warm





Results


Laboratory Results: 


                                        





                                 03/16/19 07:04 





                                 03/14/19 05:00 








Impressions: 


                                        





Chest X-Ray  03/15/19 00:00


IMPRESSION:  Residual versus recurrent cavitary pneumonia left lower lobe.


 














Assessment & Plan





- Diagnosis


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


Is this a current diagnosis for this admission?: Yes   


Plan: 


Was supposed to only need 3 more days of Ancef, but it looks like we can still 

see the vegetation on the echocardiogram done here, so vancomycin was added and 

infectious disease was reconsulted.  Her cardiologist recommended that if 

something needs to be done with his heart valve, that we should send him 

somewhere besides violent, because when they looked at them they did not think 

anything else should be done.  It is noted that when he was transferred back 

here, they transferred him with a temperature of 102 F








(2) Gram-negative bacteremia


Is this a current diagnosis for this admission?: Yes   


Plan: 


His PICC line was pulled and the tip was cultured, and it is growing out a gram-

negative organism.  This was done because 1 of his blood cultures that was done 

here turned positive for gram-negative organism.  Because he seems to be 

responding at this point to his current treatment, his antibiotics have not been

adjusted, and will wait until his cultures come back before changing his 

regimen, unless infectious disease thinks differently, or unless he 

deteriorates.








(3) IV heroin addiction


Is this a current diagnosis for this admission?: Yes   


Plan: 


He does not seem interested in quitting.








- Time


Time Spent with patient: 25-34 minutes

## 2019-03-18 LAB
ADD MANUAL DIFF: NO
ALBUMIN SERPL-MCNC: 2.9 G/DL (ref 3.5–5)
ALP SERPL-CCNC: 94 U/L (ref 38–126)
ALT SERPL-CCNC: 18 U/L (ref 21–72)
ANION GAP SERPL CALC-SCNC: 12 MMOL/L (ref 5–19)
AST SERPL-CCNC: 15 U/L (ref 17–59)
BASOPHILS # BLD AUTO: 0 10^3/UL (ref 0–0.2)
BASOPHILS NFR BLD AUTO: 0.5 % (ref 0–2)
BILIRUB DIRECT SERPL-MCNC: 0.3 MG/DL (ref 0–0.4)
BILIRUB SERPL-MCNC: 0.3 MG/DL (ref 0.2–1.3)
BUN SERPL-MCNC: 4 MG/DL (ref 7–20)
CALCIUM: 8.9 MG/DL (ref 8.4–10.2)
CHLORIDE SERPL-SCNC: 112 MMOL/L (ref 98–107)
CO2 SERPL-SCNC: 19 MMOL/L (ref 22–30)
EOSINOPHIL # BLD AUTO: 0.2 10^3/UL (ref 0–0.6)
EOSINOPHIL NFR BLD AUTO: 1.9 % (ref 0–6)
ERYTHROCYTE [DISTWIDTH] IN BLOOD BY AUTOMATED COUNT: 16.2 % (ref 11.5–14)
GLUCOSE SERPL-MCNC: 116 MG/DL (ref 75–110)
HCT VFR BLD CALC: 27.8 % (ref 37.9–51)
HGB BLD-MCNC: 9.6 G/DL (ref 13.5–17)
LYMPHOCYTES # BLD AUTO: 2.9 10^3/UL (ref 0.5–4.7)
LYMPHOCYTES NFR BLD AUTO: 29.3 % (ref 13–45)
MCH RBC QN AUTO: 28.9 PG (ref 27–33.4)
MCHC RBC AUTO-ENTMCNC: 34.5 G/DL (ref 32–36)
MCV RBC AUTO: 84 FL (ref 80–97)
MONOCYTES # BLD AUTO: 0.7 10^3/UL (ref 0.1–1.4)
MONOCYTES NFR BLD AUTO: 7.2 % (ref 3–13)
NEUTROPHILS # BLD AUTO: 6.1 10^3/UL (ref 1.7–8.2)
NEUTS SEG NFR BLD AUTO: 61.1 % (ref 42–78)
PLATELET # BLD: 199 10^3/UL (ref 150–450)
POTASSIUM SERPL-SCNC: 3.4 MMOL/L (ref 3.6–5)
PROT SERPL-MCNC: 5.5 G/DL (ref 6.3–8.2)
RBC # BLD AUTO: 3.32 10^6/UL (ref 4.35–5.55)
SODIUM SERPL-SCNC: 143.2 MMOL/L (ref 137–145)
TOTAL CELLS COUNTED % (AUTO): 100 %
VANCOMYCIN,TROUGH: 7.4 UG/ML (ref 5–20)
WBC # BLD AUTO: 9.9 10^3/UL (ref 4–10.5)

## 2019-03-18 RX ADMIN — HYDROMORPHONE HYDROCHLORIDE PRN MG: 2 TABLET ORAL at 13:40

## 2019-03-18 RX ADMIN — QUETIAPINE FUMARATE SCH MG: 100 TABLET, FILM COATED ORAL at 17:28

## 2019-03-18 RX ADMIN — VANCOMYCIN HYDROCHLORIDE SCH MLS/HR: 1 INJECTION, POWDER, LYOPHILIZED, FOR SOLUTION INTRAVENOUS at 07:27

## 2019-03-18 RX ADMIN — BUSPIRONE HYDROCHLORIDE SCH MG: 10 TABLET ORAL at 17:28

## 2019-03-18 RX ADMIN — FONDAPARINUX SODIUM SCH: 2.5 INJECTION, SOLUTION SUBCUTANEOUS at 08:11

## 2019-03-18 RX ADMIN — CEFAZOLIN SODIUM SCH MLS/HR: 2 SOLUTION INTRAVENOUS at 01:48

## 2019-03-18 RX ADMIN — Medication SCH ML: at 06:48

## 2019-03-18 RX ADMIN — HYDROMORPHONE HYDROCHLORIDE PRN MG: 2 TABLET ORAL at 20:04

## 2019-03-18 RX ADMIN — CEFAZOLIN SODIUM SCH MLS/HR: 2 SOLUTION INTRAVENOUS at 17:28

## 2019-03-18 RX ADMIN — Medication SCH: at 21:52

## 2019-03-18 RX ADMIN — CEFAZOLIN SODIUM SCH MLS/HR: 2 SOLUTION INTRAVENOUS at 06:48

## 2019-03-18 RX ADMIN — Medication SCH ML: at 13:40

## 2019-03-18 RX ADMIN — QUETIAPINE FUMARATE SCH MG: 100 TABLET, FILM COATED ORAL at 09:53

## 2019-03-18 RX ADMIN — VANCOMYCIN HYDROCHLORIDE SCH MLS/HR: 1 INJECTION, POWDER, LYOPHILIZED, FOR SOLUTION INTRAVENOUS at 22:04

## 2019-03-18 RX ADMIN — CEFAZOLIN SODIUM SCH MLS/HR: 2 SOLUTION INTRAVENOUS at 12:32

## 2019-03-18 RX ADMIN — SODIUM CHLORIDE PRN MLS/HR: 9 INJECTION, SOLUTION INTRAVENOUS at 17:28

## 2019-03-18 RX ADMIN — HYDROMORPHONE HYDROCHLORIDE PRN MG: 2 TABLET ORAL at 06:45

## 2019-03-18 RX ADMIN — VANCOMYCIN HYDROCHLORIDE SCH MLS/HR: 1 INJECTION, POWDER, LYOPHILIZED, FOR SOLUTION INTRAVENOUS at 14:33

## 2019-03-18 RX ADMIN — FERROUS SULFATE TAB 325 MG (65 MG ELEMENTAL FE) SCH MG: 325 (65 FE) TAB at 09:53

## 2019-03-18 RX ADMIN — BUSPIRONE HYDROCHLORIDE SCH MG: 10 TABLET ORAL at 09:53

## 2019-03-18 RX ADMIN — ASPIRIN 325 MG ORAL TABLET SCH MG: 325 PILL ORAL at 09:53

## 2019-03-18 RX ADMIN — SODIUM CHLORIDE PRN MLS/HR: 9 INJECTION, SOLUTION INTRAVENOUS at 07:31

## 2019-03-18 NOTE — PDOC PROGRESS REPORT
Subjective


Progress Note for:: 03/18/19


Subjective:: 





3/18/2019-.  Patient is still having the low-grade fevers the culture from the 

PICC line tip came back gram-negative rods on blood culture positive for gram-

negative rods.  Culture tip also showing Candida albicans.  Patient found to be 

snorting Dilaudid in his room.  Comfortably in the bed sleeping denies any 

problems.  Spoke to cardiothoracic team and [facility] today the recommendation is 

to do the CT chest to look for any pulmonary abscesses and if he still having 

fevers and blood cultures are continues to be positive they want me to call them

back tomorrow to be transferred up there.  In time the recommendation is to 

continue the antibiotic therapy.


Reason For Visit: 


INFECTIVE ENDOCARDITIS








Physical Exam


Vital Signs: 


                                        











Temp Pulse Resp BP Pulse Ox


 


 98.8 F   115 H  20   131/84 H  100 


 


 03/18/19 11:17  03/18/19 14:00  03/18/19 11:17  03/18/19 11:17  03/18/19 11:17








                                 Intake & Output











 03/17/19 03/18/19 03/19/19





 06:59 06:59 06:59


 


Intake Total 4992 4302 1954


 


Balance 4992 4302 1954


 


Weight 54.9 kg 58 kg 











General appearance: PRESENT: no acute distress


Head exam: PRESENT: atraumatic


Eye exam: PRESENT: PERRLA


Mouth exam: PRESENT: moist, tongue midline


Neck exam: ABSENT: carotid bruit, JVD, lymphadenopathy, thyromegaly


Respiratory exam: PRESENT: clear to auscultation tomasa.  ABSENT: rales, rhonchi, 

wheezes


Cardiovascular exam: PRESENT: systolic murmur, tachycardia


GI/Abdominal exam: PRESENT: normal bowel sounds, soft.  ABSENT: distended, guard

ing, mass, organolmegaly, rebound, tenderness


Extremities exam: PRESENT: full ROM.  ABSENT: calf tenderness, clubbing, pedal 

edema


Neurological exam: PRESENT: alert, awake, oriented to person, oriented to place,

oriented to time, oriented to situation, CN II-XII grossly intact.  ABSENT: 

motor sensory deficit


Psychiatric exam: PRESENT: appropriate affect, normal mood.  ABSENT: homicidal 

ideation, suicidal ideation





Results


Laboratory Results: 


                                        





                                 03/18/19 16:30 





                                 03/18/19 16:30 





                                        











  03/18/19 03/18/19





  16:30 16:30


 


WBC  9.9 


 


RBC  3.32 L 


 


Hgb  9.6 L 


 


Hct  27.8 L 


 


MCV  84 


 


MCH  28.9 


 


MCHC  34.5 


 


RDW  16.2 H 


 


Plt Count  199 


 


Seg Neutrophils %  61.1 


 


Lymphocytes %  29.3 


 


Monocytes %  7.2 


 


Eosinophils %  1.9 


 


Basophils %  0.5 


 


Absolute Neutrophils  6.1 


 


Absolute Lymphocytes  2.9 


 


Absolute Monocytes  0.7 


 


Absolute Eosinophils  0.2 


 


Absolute Basophils  0.0 


 


Sodium   143.2


 


Potassium   3.4 L


 


Chloride   112 H


 


Carbon Dioxide   19 L


 


Anion Gap   12


 


BUN   4 L


 


Creatinine   0.45 L


 


Est GFR ( Amer)   > 60


 


Est GFR (Non-Af Amer)   > 60


 


Glucose   116 H


 


Calcium   8.9


 


Magnesium   1.6


 


Total Bilirubin   0.3


 


AST   15 L


 


ALT   18 L


 


Alkaline Phosphatase   94


 


Total Protein   5.5 L


 


Albumin   2.9 L











Impressions: 


                                        





Chest X-Ray  03/15/19 00:00


IMPRESSION:  Residual versus recurrent cavitary pneumonia left lower lobe.


 














Assessment & Plan





- Diagnosis


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


Is this a current diagnosis for this admission?: Yes   


Plan: 


Was supposed to only need 3 more days of Ancef, but it looks like we can still 

see the vegetation on the echocardiogram done here, so vancomycin was added and 

infectious disease was reconsulted.  Her cardiologist recommended that if 

something needs to be done with his heart valve, that we should send him 

somewhere besides violent, because when they looked at them they did not think 

anything else should be done.  It is noted that when he was transferred back 

here, they transferred him with a temperature of 102 F





3/18/2019-we repeated echocardiogram on Friday shows a vegetation on the 

tricuspid valve with severe tricuspid regurgitation.  I spoke to cardiothoracic 

team in [facility] with him and updated about cultures from the PICC line tip is 

positive for gram-negative rods and Candida albicans and blood cultures are 

positive for gram-negative rods and staph epidermidis the recommendation is to 

do the CT and if the cultures are persistently positive and continue to have 

fever they may take him back to [facility] for possible surgery tomorrow.  

Appreciate their concern about the patient.








(2) Sepsis


Is this a current diagnosis for this admission?: Yes   


Plan: 


His PICC line was pulled and the tip was cultured, and it is growing out a 

gram-negative organism.  This was done because 1 of his blood cultures that was 

done here turned positive for gram-negative organism.  Because he seems to be 

responding at this point to his current treatment, his antibiotics have not been

adjusted, and will wait until his cultures come back before changing his 

regimen, unless infectious disease thinks differently, or unless he 

deteriorates.





3/18/2019-patient is running low at care fever blood cultures are positive for 

gram-negative sounds and staph epidermidis and echocardiogram prior to 

visitation and patient has sepsis.  Presently on cefazolin and vancomycin.  I am

going to put him on Diflucan.








(3) IV heroin addiction


Is this a current diagnosis for this admission?: Yes   


Plan: 


3/18 /2019 patient history of IV drug abuse-patient is found to be snorting 

Dilaudid in the hospital room.  Counseling was provided to the patient.  A 

consult was requested.








(4) Pneumonia


Qualifiers: 


   Pneumonia type: due to unspecified organism   Laterality: bilateral   Lung 

location: unspecified part of lung   Qualified Code(s): J18.9 - Pneumonia, 

unspecified organism   


Is this a current diagnosis for this admission?: Yes   


Plan: 


Latest chest x-ray suggestive of residual versus recurrent cavitary pneumonia 

left lower lobe.  As per cardiothoracic recommendations we are going to do the 

CTA of the chest for further evaluation.  Plan is to continue the present 

management.








- Time


Time Spent with patient: 35 or more minutes


Smoking Cessation Education: over 10 minutes


Medications reviewed and adjusted accordingly: Yes


Anticipated discharge: [facility]

## 2019-03-18 NOTE — PROGRESS NOTE
Provider Note


Provider Note: 


ID Consult Note





Asked to review patient's chart by Jeannette Mayfield NP. Pt not seen or examined. 

Over the weekend, the patient's blood cultures returned showing growth of a Gram

negative tommie and Staphylococcus epidermidis from a PICC line draw on 3/14 and 

Candida albicans and a Gram negative tommie from the PICC catheter tip from 3/16. 

No peripheral blood cultures were drawn at the same time. Repeat blood cultures 

from 3/16 have no growth. 





Pt had fever up to 103 F on 3/16. He has not had any since then, which is when 

PICC was removed. Pt is still abusing opiates in the hospital.





Impression/Recommendations


1. Endocarditis due to MSSA, tricuspid valve - this has been adequately treated.

Residual vegetation can be seen after treatment. 





2. There are cavitary lung lesions on CT scan that look like pulmonary 

abscesses. They are most likely due to MSSA if they evolved from the septic 

pulmonary infarcts. Would continue treating with an agent active against MSSA. 





3. Polymicrobial PICC line infection with methicillin-resistant Staphylococcus 

epidermidis (MRSE), a Gram negative tommie, and C albicans


- Definitive diagnosis of a catheter related bacteremia depends on getting 

cultures from both a peripheral stick and from off the line or from a peripheral

stick and the catheter tip. The patient did not have peripheral blood cultures 

done at the same time as he had blood drawn from the PICC line, which grew a 

Gram negative tommie and Staph epi. The tip grew a Gram negative tommie and Candida 

albicans. Even though definitive diagnosis cannot be made cleanly with the 

information at hand, pt had fever up to 103 F on 3/16, and abuse of PICC line is

a concern in active IV drug users. He can have true polymicrobial bacteremia.


- Pending identification of the Gram negative tommie, consider switching cefazolin 

to Zosyn. Can de-escalate when more is known. Zosyn would have activity against 

MSSA also.


- Can continue vancomycin for now.


- Fluconazole was ordered 100 mg to start tomorrow 3/19. Would give 600 mg as a 

loading dose followed by 400 mg daily. 





Gonzalo Blackwell MD


[facility] Infectious Diseases


pager [phone]

## 2019-03-18 NOTE — RADIOLOGY REPORT (SQ)
EXAM DESCRIPTION: 



CT CHEST ANGIOGRAPHY WITHOUT THEN WITH IV CONTRAST,

three-dimensional reconstructions



COMPLETED DATE/TME:  03/18/2019 00:00



CLINICAL HISTORY: 



18 years, Male, pulmonary septic emboli with abscess



This exam was performed according to our departmental

dose-optimization program which includes automated exposure

control, adjustment of the mA and/or kVp according to patient

size and/or use of iterative reconstruction technique where

applicable.



Compared to chest CT dated 1/31/2019.



FINDINGS: Pulmonary arteries are well opacified with no

significant filling defects in the pulmonary arterial tree to

suggest acute pulmonary embolism. Aorta is within normal limits

with no aneurysm. Mild mediastinal, subcarinal and bilateral

hilar lymphadenopathy. No significant pericardial effusion. Small

layering bilateral pleural effusions. The visualized upper

abdominal organs are within normal limits.



Evaluation of the lung parenchyma demonstrates trachea and major

airways to be patent. Multifocal regions of airspace disease are

noted, improved from the prior study with cavitary changes that

are also improved. Cavitary changes are most dominant the right

upper lobe and left lower lobe that all of these are improved. No

significant new regions of pneumonia and cavitary changes.



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

is noted measuring 2 x 1.3 cm. This corresponds to a previous

region of pulmonary embolism, which is recanalized but also

likely caused a pseudoaneurysm. No evidence for active bleeding.







IMPRESSION:



Overall significant improvement in multifocal pneumonia and

cavitation. No evidence for acute pulmonary embolism. Right lower

lobe pulmonary arterial aneurysm/pseudoaneurysms is noted in the

region of previously noted pulmonary embolism.

## 2019-03-19 LAB
ADD MANUAL DIFF: NO
ANION GAP SERPL CALC-SCNC: 7 MMOL/L (ref 5–19)
BASOPHILS # BLD AUTO: 0.1 10^3/UL (ref 0–0.2)
BASOPHILS NFR BLD AUTO: 0.6 % (ref 0–2)
BUN SERPL-MCNC: 2 MG/DL (ref 7–20)
CALCIUM: 8.7 MG/DL (ref 8.4–10.2)
CHLORIDE SERPL-SCNC: 109 MMOL/L (ref 98–107)
CO2 SERPL-SCNC: 23 MMOL/L (ref 22–30)
EOSINOPHIL # BLD AUTO: 0.2 10^3/UL (ref 0–0.6)
EOSINOPHIL NFR BLD AUTO: 2.2 % (ref 0–6)
ERYTHROCYTE [DISTWIDTH] IN BLOOD BY AUTOMATED COUNT: 16.2 % (ref 11.5–14)
GLUCOSE SERPL-MCNC: 89 MG/DL (ref 75–110)
HCT VFR BLD CALC: 25.9 % (ref 37.9–51)
HGB BLD-MCNC: 9 G/DL (ref 13.5–17)
LYMPHOCYTES # BLD AUTO: 3.4 10^3/UL (ref 0.5–4.7)
LYMPHOCYTES NFR BLD AUTO: 35.1 % (ref 13–45)
MCH RBC QN AUTO: 29 PG (ref 27–33.4)
MCHC RBC AUTO-ENTMCNC: 34.7 G/DL (ref 32–36)
MCV RBC AUTO: 84 FL (ref 80–97)
MONOCYTES # BLD AUTO: 1 10^3/UL (ref 0.1–1.4)
MONOCYTES NFR BLD AUTO: 10.3 % (ref 3–13)
NEUTROPHILS # BLD AUTO: 5 10^3/UL (ref 1.7–8.2)
NEUTS SEG NFR BLD AUTO: 51.8 % (ref 42–78)
PLATELET # BLD: 184 10^3/UL (ref 150–450)
POTASSIUM SERPL-SCNC: 3.3 MMOL/L (ref 3.6–5)
RBC # BLD AUTO: 3.1 10^6/UL (ref 4.35–5.55)
SODIUM SERPL-SCNC: 139.1 MMOL/L (ref 137–145)
TOTAL CELLS COUNTED % (AUTO): 100 %
WBC # BLD AUTO: 9.6 10^3/UL (ref 4–10.5)

## 2019-03-19 RX ADMIN — HYDROMORPHONE HYDROCHLORIDE PRN MG: 2 TABLET ORAL at 01:53

## 2019-03-19 RX ADMIN — Medication SCH ML: at 22:11

## 2019-03-19 RX ADMIN — SODIUM CHLORIDE PRN MLS/HR: 9 INJECTION, SOLUTION INTRAVENOUS at 06:01

## 2019-03-19 RX ADMIN — FERROUS SULFATE TAB 325 MG (65 MG ELEMENTAL FE) SCH MG: 325 (65 FE) TAB at 09:48

## 2019-03-19 RX ADMIN — CEFAZOLIN SODIUM SCH MLS/HR: 2 SOLUTION INTRAVENOUS at 00:01

## 2019-03-19 RX ADMIN — CEFAZOLIN SODIUM SCH MLS/HR: 2 SOLUTION INTRAVENOUS at 18:18

## 2019-03-19 RX ADMIN — BUSPIRONE HYDROCHLORIDE SCH MG: 10 TABLET ORAL at 09:48

## 2019-03-19 RX ADMIN — HYDROMORPHONE HYDROCHLORIDE PRN MG: 2 TABLET ORAL at 22:19

## 2019-03-19 RX ADMIN — Medication SCH ML: at 05:18

## 2019-03-19 RX ADMIN — Medication SCH: at 14:20

## 2019-03-19 RX ADMIN — HYDROMORPHONE HYDROCHLORIDE PRN MG: 2 TABLET ORAL at 18:19

## 2019-03-19 RX ADMIN — CEFAZOLIN SODIUM SCH MLS/HR: 2 SOLUTION INTRAVENOUS at 11:14

## 2019-03-19 RX ADMIN — FONDAPARINUX SODIUM SCH: 2.5 INJECTION, SOLUTION SUBCUTANEOUS at 08:48

## 2019-03-19 RX ADMIN — HYDROMORPHONE HYDROCHLORIDE PRN MG: 2 TABLET ORAL at 14:38

## 2019-03-19 RX ADMIN — VANCOMYCIN HYDROCHLORIDE SCH MLS/HR: 1 INJECTION, POWDER, LYOPHILIZED, FOR SOLUTION INTRAVENOUS at 16:01

## 2019-03-19 RX ADMIN — ASPIRIN 325 MG ORAL TABLET SCH MG: 325 PILL ORAL at 09:49

## 2019-03-19 RX ADMIN — BUSPIRONE HYDROCHLORIDE SCH MG: 10 TABLET ORAL at 18:19

## 2019-03-19 RX ADMIN — CEFAZOLIN SODIUM SCH MLS/HR: 2 SOLUTION INTRAVENOUS at 05:16

## 2019-03-19 RX ADMIN — HYDROMORPHONE HYDROCHLORIDE PRN MG: 2 TABLET ORAL at 08:19

## 2019-03-19 RX ADMIN — QUETIAPINE FUMARATE SCH MG: 100 TABLET, FILM COATED ORAL at 09:49

## 2019-03-19 RX ADMIN — VANCOMYCIN HYDROCHLORIDE SCH MLS/HR: 1 INJECTION, POWDER, LYOPHILIZED, FOR SOLUTION INTRAVENOUS at 22:11

## 2019-03-19 RX ADMIN — VANCOMYCIN HYDROCHLORIDE SCH MLS/HR: 1 INJECTION, POWDER, LYOPHILIZED, FOR SOLUTION INTRAVENOUS at 06:00

## 2019-03-19 RX ADMIN — CEFAZOLIN SODIUM SCH MLS/HR: 2 SOLUTION INTRAVENOUS at 23:53

## 2019-03-19 NOTE — PDOC PROGRESS REPORT
Subjective


Progress Note for:: 03/19/19


Subjective:: 





3/18/2019-.  Patient is still having the low-grade fevers the culture from the 

PICC line tip came back gram-negative rods on blood culture positive for gram-

negative rods.  Culture tip also showing Candida albicans.  Patient found to be 

snorting Dilaudid in his room.  Comfortably in the bed sleeping denies any 

problems.  Spoke to cardiothoracic team and vidant today the recommendation is 

to do the CT chest to look for any pulmonary abscesses and if he still having 

fevers and blood cultures are continues to be positive they want me to call them

back tomorrow to be transferred up there.  In time the recommendation is to 

continue the antibiotic therapy.





2/19/2019-patient is having the low-grade fever T-max of 99.7 blood cultures 

done yesterday cultures are pending.  CT scan done yesterday for pulmonary 

abscesses is reviewed by the infectious doctor Dr. Blackwell she thinks significant 

improvement in the septic emboli and no pulmonary abscess is seen.  Microbiology

talk to Dr. Blackwell they think they have suspicion for yeast in the blood cultures

done on 16th probably will get better picture tomorrow in the meantime Dr. Blackwell

wants me to start the patient on fluconazole 600 mg p.o. today and 400 mg daily 

from tomorrow.  Continue the antibiotic therapy.  Is complaining of increasing 

pain requesting Dilaudid IV every 4 hours as needed.  As per Dr. Blackwell's 

recommendations before starting the patient on fluconazole we going to do the 

EKG look for any QT interval ,I stopped Seroquel.


Reason For Visit: 


INFECTIVE ENDOCARDITIS








Physical Exam


Vital Signs: 


                                        











Temp Pulse Resp BP Pulse Ox


 


 98.5 F   87   16   123/86 H  100 


 


 03/19/19 11:54  03/19/19 14:00  03/19/19 11:54  03/19/19 11:54  03/19/19 11:54








                                 Intake & Output











 03/18/19 03/19/19 03/20/19





 06:59 06:59 06:59


 


Intake Total 4302 5091 2013


 


Balance 4302 5099 2013


 


Weight 58 kg 60.7 kg 











General appearance: PRESENT: no acute distress, thin


Head exam: PRESENT: atraumatic


Mouth exam: PRESENT: moist, tongue midline


Neck exam: ABSENT: carotid bruit, JVD, lymphadenopathy, thyromegaly


Respiratory exam: PRESENT: clear to auscultation tomasa.  ABSENT: rales, rhonchi, 

wheezes


Cardiovascular exam: PRESENT: systolic murmur, tachycardia


GI/Abdominal exam: PRESENT: normal bowel sounds, soft.  ABSENT: distended, 

guarding, mass, organolmegaly, rebound, tenderness


Extremities exam: PRESENT: full ROM.  ABSENT: calf tenderness, clubbing, pedal 

edema


Neurological exam: PRESENT: alert, awake, oriented to person, oriented to place,

oriented to time, oriented to situation, CN II-XII grossly intact.  ABSENT: 

motor sensory deficit


Skin exam: PRESENT: erythema, other - Edema in the groin area.





Results


Laboratory Results: 


                                        





                                 03/18/19 16:30 





                                 03/18/19 16:30 








Impressions: 


                                        





Chest X-Ray  03/15/19 00:00


IMPRESSION:  Residual versus recurrent cavitary pneumonia left lower lobe.


 








Chest/Abdomen CTA  03/18/19 00:00


IMPRESSION:


 


Overall significant improvement in multifocal pneumonia and


cavitation. No evidence for acute pulmonary embolism. Right lower


lobe pulmonary arterial aneurysm/pseudoaneurysms is noted in the


region of previously noted pulmonary embolism.


 














Assessment and Plan





- Diagnosis


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


Is this a current diagnosis for this admission?: Yes   


Plan: 





03/19/19 17:24


Was supposed to only need 3 more days of Ancef, but it looks like we can still 

see the vegetation on the echocardiogram done here, so vancomycin was added and 

infectious disease was reconsulted.  Her cardiologist recommended that if 

something needs to be done with his heart valve, that we should send him 

somewhere besides violent, because when they looked at them they did not think 

anything else should be done.  It is noted that when he was transferred back 

here, they transferred him with a temperature of 102 F





3/18/2019-we repeated echocardiogram on Friday shows a vegetation on the 

tricuspid valve with severe tricuspid regurgitation.  I spoke to cardiothoracic 

team in [place] with him and updated about cultures from the PICC line tip is 

positive for gram-negative rods and Candida albicans and blood cultures are 

positive for gram-negative rods and staph epidermidis the recommendation is to 

do the CT and if the cultures are persistently positive and continue to have 

fever they may take him back to [facility] for possible surgery tomorrow.  A

ppreciate their concern about the patient.





3/19/2019 plan is to continue the IV antibiotic therapy with vancomycin and 

cefazolin.  As per ID recommendations started on fluconazole 600 mg today on 400

mg tomorrow because of the high suspicion for a blood cultures positive for 

yeast.  T-max is 99.7.  Repeat blood cultures today.








(2) Sepsis


Is this a current diagnosis for this admission?: Yes   


Plan: 








His PICC line was pulled and the tip was cultured, and it is growing out a gram-

negative organism.  This was done because 1 of his blood cultures that was done 

here turned positive for gram-negative organism.  Because he seems to be 

responding at this point to his current treatment, his antibiotics have not been

adjusted, and will wait until his cultures come back before changing his 

regimen, unless infectious disease thinks differently, or unless he 

deteriorates.





3/18/2019-patient is running low at care fever blood cultures are positive for 

gram-negative sounds and staph epidermidis and echocardiogram prior to visitatio

n and patient has sepsis.  Presently on cefazolin and vancomycin.  I am going to

put him on Diflucan.








3/19/2019-the cultures from the PICC line on 14th positive for gram-negative 

rods and Candida, PICC line tip cultures on 16 thshows gram-negative rods and 

Candida.  Patient fevers are decided after taking of the PICC line.  Waiting for

the culture report from 18th.  We are going to follow the ID recommendations.  

Echocardiogram was done on Friday shows vegetation on the tricuspid valve with 

severe tricuspid regurgitation.








(3) IV heroin addiction


Is this a current diagnosis for this admission?: Yes   


Plan: 








3/18 /2019 patient history of IV drug abuse-patient is found to be snorting 

Dilaudid in the hospital room.  Counseling was provided to the patient.  A 

consult was requested.





3/19/2019 patient has history of IV drug abuse 2 days ago he was found to be 

snorting Dilaudid in the hospital room.  We going to  arrange for psych consult.











(4) Pneumonia


Qualifiers: 


   Pneumonia type: due to unspecified organism   Laterality: bilateral   Lung 

location: unspecified part of lung   Qualified Code(s): J18.9 - Pneumonia, 

unspecified organism   


Is this a current diagnosis for this admission?: Yes   


Plan: 








Latest chest x-ray suggestive of residual versus recurrent cavitary pneumonia 

left lower lobe.  As per cardiothoracic recommendations we are going to do the 

CTA of the chest for further evaluation.  Plan is to continue the present 

management.





3/19/2019-repeat CT scan done yesterday shows no pulmonary abscesses and 

improvement in the appearance of the pulmonary septic emboli.  As per ID 

recommendations plan is to continue the present management.








- Time


Time Spent with patient: 15-24 minutes


Medications reviewed and adjusted accordingly: Yes


Anticipated discharge: Home

## 2019-03-19 NOTE — PROGRESS NOTE
Provider Note


Provider Note: 


ID Consult Note





Reviewed patient's chart. Pt not seen or examined. Mr Cuevas is a 18 year old man

who uses IV drugs and had been diagnosed in January 2019 with tricuspid valve 

MSSA endocarditis with septic pulmonary emboli, which was managed with IV 

cefazolin and Angiovac debridement of the valve. The patient was transfered to 

[facility] for these interventions and then back to [place] to 

complete the planned 6 week treatment course with cefazolin.  





He had a blood culture drawn off of the PICC line on 3/14, which yielded growth 

of a Gram negative tommie that has yet to be identified and also Staph epidermidis.

No peripheral cultures were done at the same time.





He had the PICC line removed on 3/16, which is also when he had fever up to 103 

F. The PICC tip grew Candida albicans and also a Gram negative tommie. Once the 

PICC was removed on 3/16, blood cultures were done from percutaneous sticks. 

These are negative x 72h, and he has had no further fever. 





I called the Micro lab today. There is no definitive growth that has been 

reported yet, but preliminarily there is some concern that a "Gram positive tommie"

that appears to be more consistent with a yeast may be present in these 

cultures. This has yet to be officially reported because it requires further 

investigation.





Impression/Recommendations


1. PICC line contamination vs true bloodstream infection


- The blood drawn from the PICC on 3/14 that grew MRSE and a Gram negative tommie 

does not provide good evidence of bacteremia, only of PICC line contamination or

colonization. The same is true for the PICC tip culture that grew yeast and a 

Gram negative tommie. In absence of peripheral blood cultures showing further g

rowth, there is no clear significance to these results to say there absolutely 

must be some defined duration of therapy after the PICC removal when he has had 

no fevers after that point.


- What I am concerned about is the verbally reported question of yeast from the 

peripheral blood cultures from 3/16. This is a result that needs further 

clarification and workup by the Microbiology lab. It is not officially reported.

However, in combination with the patient having continued drug use and the PICC 

tip growing yeast, I think it is enough to initiate the higher dose of 

fluconazole as long as it can be done safely.  


- I would give the patient a higher dose of fluconazole today (total dose of 700

mg or ~12 mg/kg, check EKG given concurrent medications Seroquel and Zofran that

can also prolong QTc) and then continue on 400 mg daily pending further 

information.  





2. Lung abscesses due to septic pulmonary emboli from MSSA tricuspid valve 

endocarditis


- CT scan repeated on 3/18; unsurprisingly, there are still some cavitary 

lesions, but no air-fluid levels are left and no new regions of lung involvement

are present. 


- Fever on 3/16 is not likely to be related to the prior lung abscesses/septic 

emboli and MSSA tricuspid valve endocarditis, for which the patient has already 

received adequate management with tricuspid valve debridement and prolonged IV 

cefazolin.


- Do not think there is a role for continuing cefazolin or giving further 

treatment aimed at MSSA. Would stop cefazolin. 





Gonzalo Blackwell MD


[facility] Infectious Diseases


pager [phone]

## 2019-03-20 LAB — VANCOMYCIN,TROUGH: 15.5 UG/ML (ref 5–20)

## 2019-03-20 RX ADMIN — NYSTATIN CREAM SCH APPLIC: 100000 CREAM TOPICAL at 17:10

## 2019-03-20 RX ADMIN — CEFAZOLIN SODIUM SCH MLS/HR: 2 SOLUTION INTRAVENOUS at 05:14

## 2019-03-20 RX ADMIN — HYDROMORPHONE HYDROCHLORIDE PRN MG: 2 TABLET ORAL at 15:10

## 2019-03-20 RX ADMIN — HYDROMORPHONE HYDROCHLORIDE PRN MG: 2 TABLET ORAL at 10:09

## 2019-03-20 RX ADMIN — Medication SCH: at 13:30

## 2019-03-20 RX ADMIN — HYDROMORPHONE HYDROCHLORIDE PRN MG: 2 TABLET ORAL at 02:21

## 2019-03-20 RX ADMIN — HYDROMORPHONE HYDROCHLORIDE PRN MG: 2 TABLET ORAL at 06:21

## 2019-03-20 RX ADMIN — BUSPIRONE HYDROCHLORIDE SCH MG: 10 TABLET ORAL at 10:09

## 2019-03-20 RX ADMIN — CEFAZOLIN SODIUM SCH MLS/HR: 2 SOLUTION INTRAVENOUS at 11:37

## 2019-03-20 RX ADMIN — VANCOMYCIN HYDROCHLORIDE SCH MLS/HR: 1 INJECTION, POWDER, LYOPHILIZED, FOR SOLUTION INTRAVENOUS at 06:02

## 2019-03-20 RX ADMIN — Medication SCH ML: at 05:14

## 2019-03-20 RX ADMIN — FLUCONAZOLE SCH MG: 100 TABLET ORAL at 17:10

## 2019-03-20 RX ADMIN — HYDROMORPHONE HYDROCHLORIDE PRN MG: 2 TABLET ORAL at 23:44

## 2019-03-20 RX ADMIN — Medication SCH ML: at 22:11

## 2019-03-20 RX ADMIN — ASPIRIN 325 MG ORAL TABLET SCH MG: 325 PILL ORAL at 10:08

## 2019-03-20 RX ADMIN — FONDAPARINUX SODIUM SCH: 2.5 INJECTION, SOLUTION SUBCUTANEOUS at 07:27

## 2019-03-20 RX ADMIN — FERROUS SULFATE TAB 325 MG (65 MG ELEMENTAL FE) SCH MG: 325 (65 FE) TAB at 10:09

## 2019-03-20 RX ADMIN — HYDROMORPHONE HYDROCHLORIDE PRN MG: 2 TABLET ORAL at 19:52

## 2019-03-20 RX ADMIN — VANCOMYCIN HYDROCHLORIDE SCH MLS/HR: 1 INJECTION, POWDER, LYOPHILIZED, FOR SOLUTION INTRAVENOUS at 15:11

## 2019-03-20 RX ADMIN — BUSPIRONE HYDROCHLORIDE SCH MG: 10 TABLET ORAL at 17:09

## 2019-03-20 NOTE — PROGRESS NOTE
Provider Note


Provider Note: 


ID Consult - Brief Note





Asked by Dr De La Cruz to review patient's chart. Pt not seen or examined. 

Microbiology has been updated to reflect that 


Blood culture drawn from off of the PICC line on 3/14 grew Myroides odoratus and

Staph epidermidis


PICC catheter tip culture from 3/16 grew Candida albicans and Sphingomonas 

paucimobilis


Peripheral blood cultures from 3/16 have growth of Candida albicans from one 

blood culture bottle only.


Repeat peripheral blood cultures from 3/18 have no growth x 24h.





Patient has had no fever since PICC was removed on 3/16. He received 700 mg 

fluconazole yesterday (~12 mg/kg). He is scheduled to continue treatment with 

400 mg fluconazole today (~6 mg/kg).





Impression/Recommendations


1. Pt appears to have been adequately treated for the MSSA tricuspid valve 

endocarditis and septic pulmonary emboli, and cefazolin should be discontinued.


2. With Staph epidermidis and these two Gram negative rods only being cultured 

from the PICC line or blood drawn off the PICC line and no fever since PICC line

removal, there is no convincing evidence that the patient has a true bacteremia 

- only PICC line colonization or contamination for which PICC line removal was 

the appropriate intervention. No further therapy should be indicated. Would 

discontinue vancomycin.


3. With peripheral blood cultures having shown growth of Candida albicans, in 

addition to the PICC tip showing this organism, it appears that the patient has 

had a catheter related candidemia, and he needs to be treated for 2 weeks from 

the date of negative blood cultures with fluconazole. I would continue to give 

him 400 mg PO fluconazole. If the blood cultures are negative from 3/18, the end

date of fluconazole should be April 1st. If it is possible to have an 

ophthalmology evaluation, this is recommended for all patients with candidemia 

to screen for endophthalmitis, which may affect management.





Although acute problems are manageable, long term prognosis is poor if patient 

is unable to stop IV drug use due to risk for overdose and further infectious 

complications.





Gonzalo Blackwell MD


[facility] Infectious Diseases


pager [phone]

## 2019-03-20 NOTE — PDOC PROGRESS REPORT
Subjective


Progress Note for:: 03/20/19


Subjective:: 





3/18/2019-.  Patient is still having the low-grade fevers the culture from the 

PICC line tip came back gram-negative rods on blood culture positive for gram-

negative rods.  Culture tip also showing Candida albicans.  Patient found to be 

snorting Dilaudid in his room.  Comfortably in the bed sleeping denies any 

problems.  Spoke to cardiothoracic team and vidant today the recommendation is 

to do the CT chest to look for any pulmonary abscesses and if he still having 

fevers and blood cultures are continues to be positive they want me to call them

back tomorrow to be transferred up there.  In time the recommendation is to 

continue the antibiotic therapy.





3/19/2019-patient is having the low-grade fever T-max of 99.7 blood cultures 

done yesterday cultures are pending.  CT scan done yesterday for pulmonary 

abscesses is reviewed by the infectious doctor Dr. Blackwell she thinks significant 

improvement in the septic emboli and no pulmonary abscess is seen.  Microbiology

talk to Dr. Blackwell they think they have suspicion for yeast in the blood cultures

done on 16th probably will get better picture tomorrow in the meantime Dr. Blackwell

wants me to start the patient on fluconazole 600 mg p.o. today and 400 mg daily 

from tomorrow.  Continue the antibiotic therapy.  Is complaining of increasing 

pain requesting Dilaudid IV every 4 hours as needed.  As per Dr. Blackwell's 

recommendations before starting the patient on fluconazole we going to do the 

EKG look for any QT interval ,I stopped Seroquel.





3/20/2019-patient is still having the low-grade fevers T-max is 99.  Blood 

cultures came back positive for Candida.  And cultures also positive for a 

spingomonas  paucimobilis and  Myroides.  I spoke to infectious disease 

specialist Dr. Blackwell from [facility] her recommendation is to 

ignore the rare organisms found in the blood culture.  Cefazolin and vancomycin 

but continue fluconazole.  In her recommendation patient need to be on 

fluconazole at least for 2 weeks.  She is also said is okay for her for the 

patient to go to [place] to be reevaluated by the cardiothoracic surgeon.  No

acute events in the last 24 hours.








I spoke to cardiothoracic surgeon and widened it to [place] around 5:40 PM 

today they willing to take the patient back when the bed is available to 

reevaluate him if they are not offering anything new pt  going to come  back 

here.  I agree with the suggestion ,unfortunately no beds are available for the 

next 24-48 hours in [facility]. to send all the investigations 

including radiological reports, blood cultures lab work along with the patient 

to [place] when the bed is avilable.


Reason For Visit: 


INFECTIVE ENDOCARDITIS








Physical Exam


Vital Signs: 


                                        











Temp Pulse Resp BP Pulse Ox


 


 98.0 F   86   20   127/101 H  98 


 


 03/20/19 11:29  03/20/19 15:26  03/20/19 15:26  03/20/19 15:26  03/20/19 15:26








                                 Intake & Output











 03/19/19 03/20/19 03/21/19





 06:59 06:59 06:59


 


Intake Total 5091 3063 300


 


Balance 5091 3063 300


 


Weight 60.7 kg  











General appearance: PRESENT: no acute distress


Head exam: PRESENT: atraumatic


Eye exam: PRESENT: PERRLA


Mouth exam: PRESENT: moist, tongue midline


Neck exam: ABSENT: carotid bruit, JVD, lymphadenopathy, thyromegaly


Respiratory exam: PRESENT: decreased breath sounds


Cardiovascular exam: PRESENT: systolic murmur, tachycardia


GI/Abdominal exam: PRESENT: normal bowel sounds, soft.  ABSENT: distended, 

guarding, mass, organolmegaly, rebound, tenderness


Extremities exam: PRESENT: full ROM.  ABSENT: calf tenderness, clubbing, pedal 

edema


Neurological exam: PRESENT: alert, awake, oriented to person, oriented to place,

oriented to time, oriented to situation, CN II-XII grossly intact.  ABSENT: 

motor sensory deficit


Psychiatric exam: PRESENT: appropriate affect, normal mood.  ABSENT: homicidal 

ideation, suicidal ideation





Results


Laboratory Results: 


                                        





                                 03/19/19 19:33 





                                 03/20/19 05:57 





                                        











  03/19/19 03/19/19 03/19/19





  19:33 19:33 19:33


 


WBC    9.6


 


RBC    3.10 L


 


Hgb    9.0 L


 


Hct    25.9 L


 


MCV    84


 


MCH    29.0


 


MCHC    34.7


 


RDW    16.2 H


 


Plt Count    184


 


Seg Neutrophils %    51.8


 


Lymphocytes %    35.1


 


Monocytes %    10.3


 


Eosinophils %    2.2


 


Basophils %    0.6


 


Absolute Neutrophils    5.0


 


Absolute Lymphocytes    3.4


 


Absolute Monocytes    1.0


 


Absolute Eosinophils    0.2


 


Absolute Basophils    0.1


 


Sodium   139.1 


 


Potassium   3.3 L 


 


Chloride   109 H 


 


Carbon Dioxide   23 


 


Anion Gap   7 


 


BUN   2 L 


 


Creatinine   0.43 L 


 


Est GFR ( Amer)   > 60 


 


Est GFR (Non-Af Amer)   > 60 


 


Glucose   89 


 


Lactic Acid  0.8  


 


Calcium   8.7 














  03/20/19





  05:57


 


WBC 


 


RBC 


 


Hgb 


 


Hct 


 


MCV 


 


MCH 


 


MCHC 


 


RDW 


 


Plt Count 


 


Seg Neutrophils % 


 


Lymphocytes % 


 


Monocytes % 


 


Eosinophils % 


 


Basophils % 


 


Absolute Neutrophils 


 


Absolute Lymphocytes 


 


Absolute Monocytes 


 


Absolute Eosinophils 


 


Absolute Basophils 


 


Sodium 


 


Potassium 


 


Chloride 


 


Carbon Dioxide 


 


Anion Gap 


 


BUN 


 


Creatinine  0.50 L


 


Est GFR ( Amer)  > 60


 


Est GFR (Non-Af Amer)  > 60


 


Glucose 


 


Lactic Acid 


 


Calcium 








                                        





03/16/19 13:00   Blood   Blood Culture - Final


                            C.albicans/C.dubliniensis


03/16/19 10:40   Catheter Tip - Picc Line   Catheter Tip Culture - Final


                            Sphingomonas Paucimobilis


                            C.albicans/C.dubliniensis


03/14/19 13:45   Blood   Blood Culture - Final


                            Myroides (Flavo) Odoratus


                            Staphylococcus Epidermidis








Impressions: 


                                        





Chest X-Ray  03/15/19 00:00


IMPRESSION:  Residual versus recurrent cavitary pneumonia left lower lobe.


 








Chest/Abdomen CTA  03/18/19 00:00


IMPRESSION:


 


Overall significant improvement in multifocal pneumonia and


cavitation. No evidence for acute pulmonary embolism. Right lower


lobe pulmonary arterial aneurysm/pseudoaneurysms is noted in the


region of previously noted pulmonary embolism.


 














Assessment and Plan





- Diagnosis


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


Is this a current diagnosis for this admission?: Yes   


Plan: 





03/19/19 17:24


Was supposed to only need 3 more days of Ancef, but it looks like we can still 

see the vegetation on the echocardiogram done here, so vancomycin was added and 

infectious disease was reconsulted.  Her cardiologist recommended that if 

something needs to be done with his heart valve, that we should send him 

somewhere besides violent, because when they looked at them they did not think 

anything else should be done.  It is noted that when he was transferred back 

here, they transferred him with a temperature of 102 F





3/18/2019-we repeated echocardiogram on Friday shows a vegetation on the 

tricuspid valve with severe tricuspid regurgitation.  I spoke to cardiothoracic 

team in vidant with him and updated about cultures from the PICC line tip is 

positive for gram-negative rods and Candida albicans and blood cultures are 

positive for gram-negative rods and staph epidermidis the recommendation is to 

do the CT and if the cultures are persistently positive and continue to have 

fever they may take him back to [facility] for possible surgery tomorrow.  

Appreciate their concern about the patient.





3/19/2019 plan is to continue the IV antibiotic therapy with vancomycin and 

cefazolin.  As per ID recommendations started on fluconazole 600 mg today on 400

mg tomorrow because of the high suspicion for a blood cultures positive for 

yeast.  T-max is 99.7.  Repeat blood cultures today.





3/20/2019-as per ID recommendations will be going to stop vancomycin and 

cefazolin for now.  Continue fluconazole for at least 2 weeks because the blood 

cultures are positive for Candida.  Patient is still having the low-grade fever.

  planning to do the blood cultures on daily basis.








(2) Sepsis


Is this a current diagnosis for this admission?: Yes   


Plan: 








His PICC line was pulled and the tip was cultured, and it is growing out a 

gram-negative organism.  This was done because 1 of his blood cultures that was 

done here turned positive for gram-negative organism.  Because he seems to be 

responding at this point to his current treatment, his antibiotics have not been

adjusted, and will wait until his cultures come back before changing his r

egimen, unless infectious disease thinks differently, or unless he deteriorates.





3/18/2019-patient is running low at care fever blood cultures are positive for 

gram-negative sounds and staph epidermidis and echocardiogram prior to 

visitation and patient has sepsis.  Presently on cefazolin and vancomycin.  I am

going to put him on Diflucan.








3/19/2019-the cultures from the PICC line on 14th positive for gram-negative 

rods and Candida, PICC line tip cultures on 16 thshows gram-negative rods and 

Candida.  Patient fevers are decided after taking of the PICC line.  Waiting for

the culture report from 18th.  We are going to follow the ID recommendations.  

Echocardiogram was done on Friday shows vegetation on the tricuspid valve with 

severe tricuspid regurgitation.





3/20/2019-the cultures from the PICC line came back gram-negative rods 

Spingomonos and myroids as per ID recommendations antibiotics are discontinued. 

Culture is also showing Candida albicans plan is to continue fluconazole for at 

least 2 weeks.  I spoke to cardiothoracic team in the vidant, when the bed is 

available they are going to take him to reevaluate him.








(3) IV heroin addiction


Is this a current diagnosis for this admission?: Yes   


Plan: 








3/18 /2019 patient history of IV drug abuse-patient is found to be snorting 

Dilaudid in the hospital room.  Counseling was provided to the patient.  A 

consult was requested.





3/19/2019 patient has history of IV drug abuse 2 days ago he was found to be 

snorting Dilaudid in the hospital room.  We going to  arrange for psych consult.








3/20/2019-patient has history of IV drug abuse psych consult was requested 

patient is refusing to cooperate.








(4) Pneumonia


Qualifiers: 


   Pneumonia type: due to unspecified organism   Laterality: bilateral   Lung 

location: unspecified part of lung   Qualified Code(s): J18.9 - Pneumonia, 

unspecified organism   


Is this a current diagnosis for this admission?: Yes   


Plan: 








Latest chest x-ray suggestive of residual versus recurrent cavitary pneumonia 

left lower lobe.  As per cardiothoracic recommendations we are going to do the 

CTA of the chest for further evaluation.  Plan is to continue the present 

management.





3/19/2019-repeat CT scan done yesterday shows no pulmonary abscesses and 

improvement in the appearance of the pulmonary septic emboli.  As per ID 

recommendations plan is to continue the present management.





3/20/2019-repeat CT scan was done on 3/18/2019 he discharges development of pse

udoaneurysm in the area of septic emboli seen in the prior CT scans.  As 

mentioned above I discussed the report with our cardiothoracic surgeons roby.








- Time


Time Spent with patient: 15-24 minutes


Medications reviewed and adjusted accordingly: Yes


Anticipated discharge: [facility]

## 2019-03-20 NOTE — PSYCHOLOGICAL NOTE
Addendum entered and electronically signed by SAWYER UHGGINS LCSWA  03/23/19 

13:22: 





evaluation conducted on 3/20/2019


Patient reports he came to the hospital because he "knew something was wrong."  

He states that he had a high fever so asked "his people" to bring him to the 

hospital.  He confirms has been in the hospital for a significant amount of 

time.  He reports he has a diagnosis of bipolar and was seen at [facility] however 

admits that he is not been to [facility] in approximately 2 years.  He states that he

was thinking about following up with them.  Patient confirms he is an IV drug 

user and states the last time he used was "the day before I came in."  He states

he normally uses heroin however does use meth on occasion.  He reports he is not

interested in sobriety stating "not using just makes me want to use more."  He 

confirms he does not have a job currently however states that April 1 he has a 

job interview with the [employer].  Patient denies ever going inpatient 

psychiatric treatment and states he is never attempted to stop using drugs ie 

detox and/or treatment.





Patient is alert and orientated to person, place, time and circumstance.  Mood 

is euthymic with congruent affect.  Patient denies suicidal homicidal ideation. 

Delusions are absent behaviors congruent with an intact reality based 

presentation i.e. organized and linear thought process.  Eye contact is well-

maintained.  Conversational speech is within normal rate, tone and prosody.  

Intellectual abilities appear to be within the average range.  Attention and 

concentration are good.  Insight, judgment, impulse control are historically 

poor due to substance abuse.





Original Note:








Psych Note





- Psych Note


Date seen by psych provider: 03/20/19


Time seen by psych provider: 12:30


Psych Note: 


Reason for Consult: substance abuse





impression/Plan: Patient is cleared from acute psychiatric services.  Patient 

refuses assistance with sobriety reporting that he only wants to use drugs more 

when he is sober.  Clinician conducted psychoeducation on the importance of his 

current medical condition in regards to his drug use.  Patient reports he is 

fully aware that his current medical condition as a result of his IV drug use.  

He continues to confirm he understands the severity of his medical condition.  

He was willing to take resource information from clinician however again stated 

he was not interested in sobriety.  Please re-consult if new concerns arise or 

patient would like to re-discuss treatment options.  Dr. Healy was consulted 

to care management this patient; attending physicians in agreement with 

recommendations and disposition.

## 2019-03-21 LAB
ADD MANUAL DIFF: NO
ALBUMIN SERPL-MCNC: 3 G/DL (ref 3.5–5)
ALP SERPL-CCNC: 93 U/L (ref 38–126)
ALT SERPL-CCNC: 17 U/L (ref 21–72)
ANION GAP SERPL CALC-SCNC: 12 MMOL/L (ref 5–19)
AST SERPL-CCNC: 21 U/L (ref 17–59)
BASOPHILS # BLD AUTO: 0.1 10^3/UL (ref 0–0.2)
BASOPHILS NFR BLD AUTO: 0.6 % (ref 0–2)
BILIRUB DIRECT SERPL-MCNC: 0.4 MG/DL (ref 0–0.4)
BILIRUB SERPL-MCNC: 0.5 MG/DL (ref 0.2–1.3)
BUN SERPL-MCNC: 6 MG/DL (ref 7–20)
CALCIUM: 9 MG/DL (ref 8.4–10.2)
CHLORIDE SERPL-SCNC: 104 MMOL/L (ref 98–107)
CO2 SERPL-SCNC: 24 MMOL/L (ref 22–30)
EOSINOPHIL # BLD AUTO: 0.4 10^3/UL (ref 0–0.6)
EOSINOPHIL NFR BLD AUTO: 2.9 % (ref 0–6)
ERYTHROCYTE [DISTWIDTH] IN BLOOD BY AUTOMATED COUNT: 16.1 % (ref 11.5–14)
GLUCOSE SERPL-MCNC: 89 MG/DL (ref 75–110)
HCT VFR BLD CALC: 27.8 % (ref 37.9–51)
HEPATITIS C VIRUS ANTIBODY: >11 S/CO RATIO (ref 0–0.9)
HGB BLD-MCNC: 9.4 G/DL (ref 13.5–17)
LYMPHOCYTES # BLD AUTO: 3.4 10^3/UL (ref 0.5–4.7)
LYMPHOCYTES NFR BLD AUTO: 27.8 % (ref 13–45)
MCH RBC QN AUTO: 28.1 PG (ref 27–33.4)
MCHC RBC AUTO-ENTMCNC: 33.7 G/DL (ref 32–36)
MCV RBC AUTO: 84 FL (ref 80–97)
MONOCYTES # BLD AUTO: 1.2 10^3/UL (ref 0.1–1.4)
MONOCYTES NFR BLD AUTO: 10 % (ref 3–13)
NEUTROPHILS # BLD AUTO: 7.1 10^3/UL (ref 1.7–8.2)
NEUTS SEG NFR BLD AUTO: 58.7 % (ref 42–78)
PLATELET # BLD: 230 10^3/UL (ref 150–450)
POTASSIUM SERPL-SCNC: 3.6 MMOL/L (ref 3.6–5)
PROT SERPL-MCNC: 6.1 G/DL (ref 6.3–8.2)
RBC # BLD AUTO: 3.33 10^6/UL (ref 4.35–5.55)
SODIUM SERPL-SCNC: 139.5 MMOL/L (ref 137–145)
TOTAL CELLS COUNTED % (AUTO): 100 %
WBC # BLD AUTO: 12.1 10^3/UL (ref 4–10.5)

## 2019-03-21 RX ADMIN — HYDROMORPHONE HYDROCHLORIDE PRN MG: 2 TABLET ORAL at 21:20

## 2019-03-21 RX ADMIN — HYDROMORPHONE HYDROCHLORIDE PRN MG: 2 TABLET ORAL at 07:33

## 2019-03-21 RX ADMIN — Medication SCH ML: at 06:21

## 2019-03-21 RX ADMIN — NYSTATIN CREAM SCH APPLIC: 100000 CREAM TOPICAL at 10:30

## 2019-03-21 RX ADMIN — Medication SCH ML: at 21:21

## 2019-03-21 RX ADMIN — ACETAMINOPHEN PRN MG: 325 TABLET ORAL at 23:24

## 2019-03-21 RX ADMIN — FLUCONAZOLE SCH MG: 100 TABLET ORAL at 17:15

## 2019-03-21 RX ADMIN — Medication SCH: at 13:48

## 2019-03-21 RX ADMIN — NYSTATIN CREAM SCH APPLIC: 100000 CREAM TOPICAL at 17:15

## 2019-03-21 RX ADMIN — FERROUS SULFATE TAB 325 MG (65 MG ELEMENTAL FE) SCH MG: 325 (65 FE) TAB at 10:29

## 2019-03-21 RX ADMIN — ASPIRIN 325 MG ORAL TABLET SCH MG: 325 PILL ORAL at 10:29

## 2019-03-21 RX ADMIN — FONDAPARINUX SODIUM SCH: 2.5 INJECTION, SOLUTION SUBCUTANEOUS at 07:29

## 2019-03-21 RX ADMIN — ACETAMINOPHEN PRN MG: 325 TABLET ORAL at 19:56

## 2019-03-21 RX ADMIN — HYDROMORPHONE HYDROCHLORIDE PRN MG: 2 TABLET ORAL at 03:32

## 2019-03-21 RX ADMIN — BUSPIRONE HYDROCHLORIDE SCH MG: 10 TABLET ORAL at 17:15

## 2019-03-21 RX ADMIN — HYDROMORPHONE HYDROCHLORIDE PRN MG: 2 TABLET ORAL at 17:15

## 2019-03-21 RX ADMIN — BUSPIRONE HYDROCHLORIDE SCH MG: 10 TABLET ORAL at 10:29

## 2019-03-21 RX ADMIN — HYDROMORPHONE HYDROCHLORIDE PRN MG: 2 TABLET ORAL at 12:40

## 2019-03-21 NOTE — PDOC PROGRESS REPORT
Subjective


Subjective:: 





3/18/2019-.  Patient is still having the low-grade fevers the culture from the 

PICC line tip came back gram-negative rods on blood culture positive for gram-

negative rods.  Culture tip also showing Candida albicans.  Patient found to be 

snorting Dilaudid in his room.  Comfortably in the bed sleeping denies any 

problems.  Spoke to cardiothoracic team and [facility] today the recommendation is 

to do the CT chest to look for any pulmonary abscesses and if he still having 

fevers and blood cultures are continues to be positive they want me to call them

back tomorrow to be transferred up there.  In time the recommendation is to 

continue the antibiotic therapy.





3/19/2019-patient is having the low-grade fever T-max of 99.7 blood cultures 

done yesterday cultures are pending.  CT scan done yesterday for pulmonary 

abscesses is reviewed by the infectious doctor Dr. Blackwell she thinks significant 

improvement in the septic emboli and no pulmonary abscess is seen.  Microbiology

talk to Dr. Blackwell they think they have suspicion for yeast in the blood cultures

done on 16th probably will get better picture tomorrow in the meantime Dr. Blackwell

wants me to start the patient on fluconazole 600 mg p.o. today and 400 mg daily 

from tomorrow.  Continue the antibiotic therapy.  Is complaining of increasing 

pain requesting Dilaudid IV every 4 hours as needed.  As per Dr. Blackwell's 

recommendations before starting the patient on fluconazole we going to do the 

EKG look for any QT interval ,I stopped Seroquel.





3/20/2019-patient is still having the low-grade fevers T-max is 99.  Blood 

cultures came back positive for Candida.  And cultures also positive for a 

spingomonas  paucimobilis and  Myroides.  I spoke to infectious disease spe

cialist Dr. Blackwell from [facility] her recommendation is to ignore 

the rare organisms found in the blood culture.  Cefazolin and vancomycin but 

continue fluconazole.  In her recommendation patient need to be on fluconazole 

at least for 2 weeks.  She is also said is okay for her for the patient to go to

[place] to be reevaluated by the cardiothoracic surgeon.  No acute events in 

the last 24 hours.








I spoke to cardiothoracic surgeon at [facility] in  [place] around 5:40 PM today 

they willing to take the patient back when the bed is available to reevaluate 

him if they are not offering anything new pt  going to come  back here.  I agree

with the suggestion ,unfortunately no beds are available for the next 24-48 

hours in [facility]. to send all the investigations including 

radiological reports, blood cultures lab work along with the patient to 

[place] when the bed is avilable.





3/21/2019 no acute events in the last 24 hours.  Patient is having the low-grade

fever T-max is 99.2.  Any complaints.  Comfortably in the bed requesting for 

breakfast.  I spoke to Dr. Blackwell yesterday about blood culture reports, her 

recommendation is to continue fluconazole 400 mg p.o. daily for 2 weeks from the

day of blood cultures negative. blood cultures done on  3/18/2019  negative so 

far so patient need fluconazole until April 1st.  As per ID recommendations IV 

antibiotic therapy is discontinued.


Reason For Visit: 


INFECTIVE ENDOCARDITIS








Physical Exam


Vital Signs: 


                                        











Temp Pulse Resp BP Pulse Ox


 


 98.3 F   100   18   137/97 H  97 


 


 03/21/19 07:40  03/21/19 07:40  03/21/19 07:40  03/21/19 07:40  03/21/19 07:40








                                 Intake & Output











 03/20/19 03/21/19 03/22/19





 06:59 06:59 06:59


 


Intake Total 3063 750 


 


Balance 3063 750 


 


Weight  61.2 kg 











General appearance: PRESENT: no acute distress


Head exam: PRESENT: atraumatic


Eye exam: PRESENT: PERRLA


Neck exam: ABSENT: carotid bruit, JVD, lymphadenopathy, thyromegaly


Respiratory exam: PRESENT: clear to auscultation tomasa.  ABSENT: rales, rhonchi, 

wheezes


Cardiovascular exam: PRESENT: systolic murmur, tachycardia


GI/Abdominal exam: PRESENT: normal bowel sounds, soft.  ABSENT: distended, 

guarding, mass, organolmegaly, rebound, tenderness


Extremities exam: PRESENT: full ROM.  ABSENT: calf tenderness, clubbing, pedal 

edema


Neurological exam: PRESENT: alert, awake, oriented to person, oriented to place,

oriented to time, oriented to situation, CN II-XII grossly intact.  ABSENT: 

motor sensory deficit


Psychiatric exam: PRESENT: appropriate affect, normal mood.  ABSENT: homicidal 

ideation, suicidal ideation





Results


Laboratory Results: 


                                        





                                 03/19/19 19:33 





                                 03/20/19 05:57 





                                        





03/16/19 13:00   Blood   Blood Culture - Final


                            C.albicans/C.dubliniensis


03/16/19 10:40   Catheter Tip - Picc Line   Catheter Tip Culture - Final


                            Sphingomonas Paucimobilis


                            C.albicans/C.dubliniensis


03/14/19 13:45   Blood   Blood Culture - Final


                            Sandip (Terao) Camiloatus


                            Staphylococcus Epidermidis








Impressions: 


                                        





Chest X-Ray  03/15/19 00:00


IMPRESSION:  Residual versus recurrent cavitary pneumonia left lower lobe.


 








Chest/Abdomen CTA  03/18/19 00:00


IMPRESSION:


 


Overall significant improvement in multifocal pneumonia and


cavitation. No evidence for acute pulmonary embolism. Right lower


lobe pulmonary arterial aneurysm/pseudoaneurysms is noted in the


region of previously noted pulmonary embolism.


 














Assessment and Plan





- Diagnosis


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


Is this a current diagnosis for this admission?: Yes   


Plan: 





03/19/19 17:24


Was supposed to only need 3 more days of Ancef, but it looks like we can still 

see the vegetation on the echocardiogram done here, so vancomycin was added and 

infectious disease was reconsulted.  Her cardiologist recommended that if 

something needs to be done with his heart valve, that we should send him somew

here besides violent, because when they looked at them they did not think 

anything else should be done.  It is noted that when he was transferred back 

here, they transferred him with a temperature of 102 F





3/18/2019-we repeated echocardiogram on Friday shows a vegetation on the 

tricuspid valve with severe tricuspid regurgitation.  I spoke to cardiothoracic 

team in [facility] with him and updated about cultures from the PICC line tip is 

positive for gram-negative rods and Candida albicans and blood cultures are 

positive for gram-negative rods and staph epidermidis the recommendation is to 

do the CT and if the cultures are persistently positive and continue to have 

fever they may take him back to [facility] for possible surgery tomorrow.  

Appreciate their concern about the patient.





3/19/2019 plan is to continue the IV antibiotic therapy with vancomycin and 

cefazolin.  As per ID recommendations started on fluconazole 600 mg today on 400

mg tomorrow because of the high suspicion for a blood cultures positive for 

yeast.  T-max is 99.7.  Repeat blood cultures today.





3/20/2019-as per ID recommendations will be going to stop vancomycin and 

cefazolin for now.  Continue fluconazole for at least 2 weeks because the blood 

cultures are positive for Candida.  Patient is still having the low-grade fever.

  planning to do the blood cultures on daily basis.





3/21/2019-patient is off the IV antibiotic therapy as per ID recommendations 

blood cultures are showing candidemia so patient is on fluconazole 400 mg daily 

for 2 weeks from the day of blood cultures negative that is 3/18.  Still having 

the low-grade fever 99.2.  Cardiogram done last week on 3/15/2019 shows 

visitation on the tricuspid wall associated with severe tricuspid valve 

regurgitation.








(2) Sepsis


Is this a current diagnosis for this admission?: Yes   


Plan: 








His PICC line was pulled and the tip was cultured, and it is growing out a gram-

negative organism.  This was done because 1 of his blood cultures that was done 

here turned positive for gram-negative organism.  Because he seems to be 

responding at this point to his current treatment, his antibiotics have not been

adjusted, and will wait until his cultures come back before changing his 

regimen, unless infectious disease thinks differently, or unless he 

deteriorates.





3/18/2019-patient is running low at care fever blood cultures are positive for 

gram-negative sounds and staph epidermidis and echocardiogram prior to 

visitation and patient has sepsis.  Presently on cefazolin and vancomycin.  I am

going to put him on Diflucan.








3/19/2019-the cultures from the PICC line on 14th positive for gram-negative 

rods and Candida, PICC line tip cultures on 16 thshows gram-negative rods and 

Candida.  Patient fevers are decided after taking of the PICC line.  Waiting for

the culture report from 18th.  We are going to follow the ID recommendations.  

Echocardiogram was done on Friday shows vegetation on the tricuspid valve with 

severe tricuspid regurgitation.





3/20/2019-the cultures from the PICC line came back gram-negative rods 

Spingomonos and myroids as per ID recommendations antibiotics are discontinued. 

Culture is also showing Candida albicans plan is to continue fluconazole for at 

least 2 weeks. I spoke to cardiothoracic team in [place] [facility], when the bed is 

available they are going to take him to reevaluate him.





3/21/2019-from the PICC line shows spingomonos  and myroids-as per Dr. Blackwell's 

recommendations IV cefazolin and IV vancomycin are discontinued.  Patient is 

presently on p.o. fluconazole.  And is still having the low-grade fever 99.2 

blood pressure is 137/97 sepsis is resolving.








(3) IV heroin addiction


Is this a current diagnosis for this admission?: Yes   


Plan: 








3/18 /2019 patient history of IV drug abuse-patient is found to be snorting 

Dilaudid in the hospital room.  Counseling was provided to the patient.  A 

consult was requested.





3/19/2019 patient has history of IV drug abuse 2 days ago he was found to be 

snorting Dilaudid in the hospital room.  We going to  arrange for psych consult.








3/20/2019-patient has history of IV drug abuse psych consult was requested 

patient is refusing to cooperate.





2/21/2019-patient has history of IV heroin drug use psych consult was requested 

patient does not want to speak to the psychiatrist.  psych team unable to give  

any recommendations.








(4) Pneumonia


Qualifiers: 


   Pneumonia type: due to unspecified organism   Laterality: bilateral   Lung 

location: unspecified part of lung   Qualified Code(s): J18.9 - Pneumonia, 

unspecified organism   


Is this a current diagnosis for this admission?: Yes   


Plan: 








Latest chest x-ray suggestive of residual versus recurrent cavitary pneumonia 

left lower lobe.  As per cardiothoracic recommendations we are going to do the 

CTA of the chest for further evaluation.  Plan is to continue the present 

management.





3/19/2019-repeat CT scan done yesterday shows no pulmonary abscesses and 

improvement in the appearance of the pulmonary septic emboli.  As per ID 

recommendations plan is to continue the present management.





3/20/2019-repeat CT scan was done on 3/18/2019 he discharges development of 

pseudoaneurysm in the area of septic emboli seen in the prior CT scans.  As 

mentioned above I discussed the report with our cardiothoracic surgeons roby.





3/21/2019-patient has septic pulmonary emboli in the latest CT scan and also 

suggestive pseudoaneurysm in the area of septic pulmonary embolism seen in the 

prior CT scans.  We are going to closely monitor the patient's pulmonary 

function.  The pneumonia most likely secondary to septic emboli.  Most likely 

gram-negative organisms.








- Time


Time Spent with patient: 15-24 minutes


Medications reviewed and adjusted accordingly: Yes


Anticipated discharge: Home

## 2019-03-22 LAB
HIV1 RNA # SERPL NAA+PROBE: <20 COPIES/ML
HIV1 RNA SPEC QL NAA+PROBE: (no result)

## 2019-03-22 RX ADMIN — HYDROMORPHONE HYDROCHLORIDE PRN MG: 2 TABLET ORAL at 21:29

## 2019-03-22 RX ADMIN — ACETAMINOPHEN PRN MG: 325 TABLET ORAL at 06:57

## 2019-03-22 RX ADMIN — BUSPIRONE HYDROCHLORIDE SCH MG: 10 TABLET ORAL at 09:25

## 2019-03-22 RX ADMIN — BUSPIRONE HYDROCHLORIDE SCH MG: 10 TABLET ORAL at 17:02

## 2019-03-22 RX ADMIN — FONDAPARINUX SODIUM SCH: 2.5 INJECTION, SOLUTION SUBCUTANEOUS at 09:27

## 2019-03-22 RX ADMIN — HYDROMORPHONE HYDROCHLORIDE PRN MG: 2 TABLET ORAL at 09:24

## 2019-03-22 RX ADMIN — Medication SCH ML: at 13:29

## 2019-03-22 RX ADMIN — Medication SCH ML: at 21:31

## 2019-03-22 RX ADMIN — ACETAMINOPHEN PRN MG: 325 TABLET ORAL at 23:25

## 2019-03-22 RX ADMIN — HYDROMORPHONE HYDROCHLORIDE PRN MG: 2 TABLET ORAL at 04:54

## 2019-03-22 RX ADMIN — HYDROMORPHONE HYDROCHLORIDE PRN MG: 2 TABLET ORAL at 13:28

## 2019-03-22 RX ADMIN — FERROUS SULFATE TAB 325 MG (65 MG ELEMENTAL FE) SCH MG: 325 (65 FE) TAB at 09:26

## 2019-03-22 RX ADMIN — Medication SCH ML: at 06:55

## 2019-03-22 RX ADMIN — ASPIRIN 325 MG ORAL TABLET SCH MG: 325 PILL ORAL at 09:25

## 2019-03-22 RX ADMIN — NYSTATIN CREAM SCH APPLIC: 100000 CREAM TOPICAL at 17:04

## 2019-03-22 RX ADMIN — NYSTATIN CREAM SCH APPLIC: 100000 CREAM TOPICAL at 09:26

## 2019-03-22 RX ADMIN — HYDROMORPHONE HYDROCHLORIDE PRN MG: 2 TABLET ORAL at 01:12

## 2019-03-22 RX ADMIN — FLUCONAZOLE SCH MG: 100 TABLET ORAL at 17:02

## 2019-03-22 RX ADMIN — HYDROMORPHONE HYDROCHLORIDE PRN MG: 2 TABLET ORAL at 17:32

## 2019-03-22 NOTE — PDOC PROGRESS REPORT
Subjective


Progress Note for:: 03/22/19


Subjective:: 





3/18/2019-.  Patient is still having the low-grade fevers the culture from the 

PICC line tip came back gram-negative rods on blood culture positive for gram-

negative rods.  Culture tip also showing Candida albicans.  Patient found to be 

snorting Dilaudid in his room.  Comfortably in the bed sleeping denies any 

problems.  Spoke to cardiothoracic team and [facility] today the recommendation is 

to do the CT chest to look for any pulmonary abscesses and if he still having 

fevers and blood cultures are continues to be positive they want me to call them

back tomorrow to be transferred up there.  In time the recommendation is to 

continue the antibiotic therapy.





3/19/2019-patient is having the low-grade fever T-max of 99.7 blood cultures 

done yesterday cultures are pending.  CT scan done yesterday for pulmonary 

abscesses is reviewed by the infectious doctor Dr. Blackwell she thinks significant 

improvement in the septic emboli and no pulmonary abscess is seen.  Microbiology

talk to Dr. Blackwell they think they have suspicion for yeast in the blood cultures

done on 16th probably will get better picture tomorrow in the meantime Dr. Blackwell

wants me to start the patient on fluconazole 600 mg p.o. today and 400 mg daily 

from tomorrow.  Continue the antibiotic therapy.  Is complaining of increasing 

pain requesting Dilaudid IV every 4 hours as needed.  As per Dr. Blackwell's 

recommendations before starting the patient on fluconazole we going to do the 

EKG look for any QT interval ,I stopped Seroquel.





3/20/2019-patient is still having the low-grade fevers T-max is 99.  Blood 

cultures came back positive for Candida.  And cultures also positive for a 

spingomonas  paucimobilis and  Myroides.  I spoke to infectious disease 

specialist Dr. Blackwell from [facility] her recommendation is to 

ignore the rare organisms found in the blood culture.  Cefazolin and vancomycin 

but continue fluconazole.  In her recommendation patient need to be on 

fluconazole at least for 2 weeks.  She is also said is okay for her for the 

patient to go to [place] to be reevaluated by the cardiothoracic surgeon.  No

acute events in the last 24 hours.








I spoke to cardiothoracic surgeon at [facility] in  [place] around 5:40 PM today 

they willing to take the patient back when the bed is available to reevaluate 

him if they are not offering anything new pt  going to come  back here.  I agree

with the suggestion ,unfortunately no beds are available for the next 24-48 

hours in [facility]. to send all the investigations including 

radiological reports, blood cultures lab work along with the patient to 

[place] when the bed is avilable.





3/21/2019 no acute events in the last 24 hours.  Patient is having the low-grade

fever T-max is 99.2.  Any complaints.  Comfortably in the bed requesting for 

breakfast.  I spoke to Dr. Blackwell yesterday about blood culture reports, her 

recommendation is to continue fluconazole 400 mg p.o. daily for 2 weeks from the

day of blood cultures negative. blood cultures done on  3/18/2019  negative so 

far so patient need fluconazole until April 1st.  As per ID recommendations IV 

antibiotic therapy is discontinued.





3/22/2019-no acute events in the last 24 hours.  Patient is still having the 

low-grade fever with T-max of 9.2.  Patient is complaining of itching and nurse 

noticed his nonspecific rash on the arms and back we started him on Benadryl 25 

mg p.o. every 6 as needed by the time he went to see the patient rash was 

resolved.  Patient is off the antibiotic therapy and is also receiving 

fluconazole 400 mg p.o. daily.  pt is comfortably in the bed denies any 

problems.


Reason For Visit: 


INFECTIVE ENDOCARDITIS








Physical Exam


Vital Signs: 


                                        











Temp Pulse Resp BP Pulse Ox


 


 98.7 F   100   19   138/96 H  95 


 


 03/22/19 07:30  03/22/19 07:30  03/22/19 07:30  03/22/19 07:30  03/22/19 07:30








                                 Intake & Output











 03/21/19 03/22/19 03/23/19





 06:59 06:59 06:59


 


Intake Total 750  


 


Balance 750  


 


Weight 61.2 kg 60 kg 











General appearance: PRESENT: no acute distress


Head exam: PRESENT: atraumatic


Eye exam: PRESENT: PERRLA


Neck exam: ABSENT: carotid bruit, JVD, lymphadenopathy, thyromegaly


Respiratory exam: PRESENT: clear to auscultation tomasa.  ABSENT: rales, rhonchi, 

wheezes


Cardiovascular exam: PRESENT: systolic murmur, tachycardia


GI/Abdominal exam: PRESENT: normal bowel sounds, soft.  ABSENT: distended, 

guarding, mass, organolmegaly, rebound, tenderness


Extremities exam: PRESENT: full ROM.  ABSENT: calf tenderness, clubbing, pedal 

edema


Neurological exam: PRESENT: alert, awake, oriented to person, oriented to place,

oriented to time, oriented to situation, CN II-XII grossly intact.  ABSENT: 

motor sensory deficit


Psychiatric exam: PRESENT: appropriate affect, normal mood.  ABSENT: homicidal 

ideation, suicidal ideation





Results


Laboratory Results: 


                                        





                                 03/21/19 09:06 





                                 03/21/19 09:06 





                                        





03/16/19 11:51   Blood   Blood Culture - Final


                            NO GROWTH IN 5 DAYS








Impressions: 


                                        





Chest X-Ray  03/15/19 00:00


IMPRESSION:  Residual versus recurrent cavitary pneumonia left lower lobe.


 








Chest/Abdomen CTA  03/18/19 00:00


IMPRESSION:


 


Overall significant improvement in multifocal pneumonia and


cavitation. No evidence for acute pulmonary embolism. Right lower


lobe pulmonary arterial aneurysm/pseudoaneurysms is noted in the


region of previously noted pulmonary embolism.


 














Assessment and Plan





- Diagnosis


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


Is this a current diagnosis for this admission?: Yes   


Plan: 





03/19/19 17:24


Was supposed to only need 3 more days of Ancef, but it looks like we can still 

see the vegetation on the echocardiogram done here, so vancomycin was added and 

infectious disease was reconsulted.  Her cardiologist recommended that if 

something needs to be done with his heart valve, that we should send him somewhe

re besides violent, because when they looked at them they did not think anything

else should be done.  It is noted that when he was transferred back here, they 

transferred him with a temperature of 102 F





3/18/2019-we repeated echocardiogram on Friday shows a vegetation on the 

tricuspid valve with severe tricuspid regurgitation.  I spoke to cardiothoracic 

team in [facility] with him and updated about cultures from the PICC line tip is 

positive for gram-negative rods and Candida albicans and blood cultures are 

positive for gram-negative rods and staph epidermidis the recommendation is to 

do the CT and if the cultures are persistently positive and continue to have 

fever they may take him back to [facility] for possible surgery tomorrow.  

Appreciate their concern about the patient.





3/19/2019 plan is to continue the IV antibiotic therapy with vancomycin and 

cefazolin.  As per ID recommendations started on fluconazole 600 mg today on 400

mg tomorrow because of the high suspicion for a blood cultures positive for 

yeast.  T-max is 99.7.  Repeat blood cultures today.





3/20/2019-as per ID recommendations will be going to stop vancomycin and 

cefazolin for now.  Continue fluconazole for at least 2 weeks because the blood 

cultures are positive for Candida.  Patient is still having the low-grade fever.

  planning to do the blood cultures on daily basis.





3/21/2019-patient is off the IV antibiotic therapy as per ID recommendations 

blood cultures are showing candidemia so patient is on fluconazole 400 mg daily 

for 2 weeks from the day of blood cultures negative that is 3/18.  Still having 

the low-grade fever 99.2.  echoCardiogram done last week on 3/15/2019 shows 

visitation on the tricuspid wall associated with severe tricuspid valve 

regurgitation.





3/22/2019-patient is taken off the antibiotic therapy as per Dr. Blackwell's 

recommendations he is presently on fluconazole for Candida albicans that was 

found in the blood cultures.  To continue fluconazole until April 1.  Latest 

blood cultures from 3/18/2019 and 3/19/2019 are negative.  Echocardiogram shows 

vegetation in the tricuspid wall in association with a severe tricuspid valve 

regurgitation.  I spoke to cardiothoracic team in [place] 2 days ago they 

agreed to reevaluate the patient but they do not have any beds available so far.








(2) Sepsis


Is this a current diagnosis for this admission?: Yes   


Plan: 








His PICC line was pulled and the tip was cultured, and it is growing out a gram-

negative organism.  This was done because 1 of his blood cultures that was done 

here turned positive for gram-negative organism.  Because he seems to be 

responding at this point to his current treatment, his antibiotics have not been

adjusted, and will wait until his cultures come back before changing his 

regimen, unless infectious disease thinks differently, or unless he d

eteriorates.





3/18/2019-patient is running low at care fever blood cultures are positive for 

gram-negative sounds and staph epidermidis and echocardiogram prior to 

visitation and patient has sepsis.  Presently on cefazolin and vancomycin.  I am

going to put him on Diflucan.








3/19/2019-the cultures from the PICC line on 14th positive for gram-negative 

rods and Candida, PICC line tip cultures on 16 thshows gram-negative rods and 

Candida.  Patient fevers are decided after taking of the PICC line.  Waiting for

the culture report from 18th.  We are going to follow the ID recommendations.  

Echocardiogram was done on Friday shows vegetation on the tricuspid valve with 

severe tricuspid regurgitation.





3/20/2019-the cultures from the PICC line came back gram-negative rods 

Spingomonos and myroids as per ID recommendations antibiotics are discontinued. 

Culture is also showing Candida albicans plan is to continue fluconazole for at 

least 2 weeks. I spoke to cardiothoracic team [place], when the bed is 

available they are going to take him to reevaluate him.





3/21/2019-from the PICC line shows spingomonos  and myroids-as per Dr. Blackwell's 

recommendations IV cefazolin and IV vancomycin are discontinued.  Patient is 

presently on p.o. fluconazole. And is still having the low-grade fever 99.2 

blood pressure is 137/97 sepsis is resolving.





3/22/2019-sepsis is resolving.








(3) IV heroin addiction


Is this a current diagnosis for this admission?: Yes   


Plan: 








3/18 /2019 patient history of IV drug abuse-patient is found to be snorting 

Dilaudid in the hospital room.  Counseling was provided to the patient.  A 

consult was requested.





3/19/2019 patient has history of IV drug abuse 2 days ago he was found to be 

snorting Dilaudid in the hospital room.  We going to  arrange for psych consult.








3/20/2019-patient has history of IV drug abuse psych consult was requested 

patient is refusing to cooperate.





3/21/2019-patient has history of IV heroin drug use psych consult was requested 

patient does not want to speak to the psychiatrist.  psych team unable to give  

any recommendations.





3/22/2019-patient has history of IV drug use and addiction psych consult was 

requested patient is uncooperative and unwilling to discuss the care with the 

psychiatric team.








(4) Pneumonia


Qualifiers: 


   Pneumonia type: due to unspecified organism   Laterality: bilateral   Lung 

location: unspecified part of lung   Qualified Code(s): J18.9 - Pneumonia, 

unspecified organism   


Is this a current diagnosis for this admission?: Yes   


Plan: 








Latest chest x-ray suggestive of residual versus recurrent cavitary pneumonia 

left lower lobe.  As per cardiothoracic recommendations we are going to do the 

CTA of the chest for further evaluation.  Plan is to continue the present m

anagement.





3/19/2019-repeat CT scan done yesterday shows no pulmonary abscesses and 

improvement in the appearance of the pulmonary septic emboli.  As per ID 

recommendations plan is to continue the present management.





3/20/2019-repeat CT scan was done on 3/18/2019 he discharges development of 

pseudoaneurysm in the area of septic emboli seen in the prior CT scans.  As 

mentioned above I discussed the report with our cardiothoracic surgeons roby.





3/21/2019-patient has septic pulmonary emboli in the latest CT scan and also 

suggestive pseudoaneurysm in the area of septic pulmonary embolism seen in the 

prior CT scans.  We are going to closely monitor the patient's pulmonary 

function.  The pneumonia most likely secondary to septic emboli.  Most likely 

gram-negative organisms.





3/22/2019-CTA of the chest was done on 3/18/2019 shows significant improvement 

of the multifocal pneumonia and cavitation.  No evidence of pulmonary embolism. 

Right lower lobe pulmonary arterial aneurysm/pseudoaneurysm is noted in the 

region of the previously noted pulmonary embolism.  Patient most likely have gr

am-negative back material pneumonia.  Plan is to repeat the CT chest tomorrow.








- Time


Time Spent with patient: 15-24 minutes


Medications reviewed and adjusted accordingly: Yes


Anticipated discharge: Home

## 2019-03-23 VITALS — DIASTOLIC BLOOD PRESSURE: 48 MMHG | SYSTOLIC BLOOD PRESSURE: 107 MMHG

## 2019-03-23 LAB
ADD MANUAL DIFF: NO
ALBUMIN SERPL-MCNC: 3.1 G/DL (ref 3.5–5)
ALP SERPL-CCNC: 90 U/L (ref 38–126)
ALT SERPL-CCNC: 20 U/L (ref 21–72)
ANION GAP SERPL CALC-SCNC: 12 MMOL/L (ref 5–19)
AST SERPL-CCNC: 16 U/L (ref 17–59)
BASOPHILS # BLD AUTO: 0.1 10^3/UL (ref 0–0.2)
BASOPHILS NFR BLD AUTO: 0.4 % (ref 0–2)
BILIRUB DIRECT SERPL-MCNC: 0.2 MG/DL (ref 0–0.4)
BILIRUB SERPL-MCNC: 0.8 MG/DL (ref 0.2–1.3)
BUN SERPL-MCNC: 11 MG/DL (ref 7–20)
CALCIUM: 8.7 MG/DL (ref 8.4–10.2)
CHLORIDE SERPL-SCNC: 103 MMOL/L (ref 98–107)
CO2 SERPL-SCNC: 23 MMOL/L (ref 22–30)
EOSINOPHIL # BLD AUTO: 0 10^3/UL (ref 0–0.6)
EOSINOPHIL NFR BLD AUTO: 0.2 % (ref 0–6)
ERYTHROCYTE [DISTWIDTH] IN BLOOD BY AUTOMATED COUNT: 16.5 % (ref 11.5–14)
GLUCOSE SERPL-MCNC: 109 MG/DL (ref 75–110)
HCT VFR BLD CALC: 26.7 % (ref 37.9–51)
HGB BLD-MCNC: 9 G/DL (ref 13.5–17)
LYMPHOCYTES # BLD AUTO: 2.9 10^3/UL (ref 0.5–4.7)
LYMPHOCYTES NFR BLD AUTO: 18.3 % (ref 13–45)
MCH RBC QN AUTO: 28.6 PG (ref 27–33.4)
MCHC RBC AUTO-ENTMCNC: 33.7 G/DL (ref 32–36)
MCV RBC AUTO: 85 FL (ref 80–97)
MONOCYTES # BLD AUTO: 1.9 10^3/UL (ref 0.1–1.4)
MONOCYTES NFR BLD AUTO: 11.8 % (ref 3–13)
NEUTROPHILS # BLD AUTO: 11 10^3/UL (ref 1.7–8.2)
NEUTS SEG NFR BLD AUTO: 69.3 % (ref 42–78)
PLATELET # BLD: 197 10^3/UL (ref 150–450)
POTASSIUM SERPL-SCNC: 3.3 MMOL/L (ref 3.6–5)
PROT SERPL-MCNC: 6.2 G/DL (ref 6.3–8.2)
RBC # BLD AUTO: 3.15 10^6/UL (ref 4.35–5.55)
SODIUM SERPL-SCNC: 138.3 MMOL/L (ref 137–145)
TOTAL CELLS COUNTED % (AUTO): 100 %
WBC # BLD AUTO: 15.9 10^3/UL (ref 4–10.5)

## 2019-03-23 RX ADMIN — HYDROMORPHONE HYDROCHLORIDE PRN MG: 2 TABLET ORAL at 09:37

## 2019-03-23 RX ADMIN — HYDROMORPHONE HYDROCHLORIDE PRN MG: 2 TABLET ORAL at 05:32

## 2019-03-23 RX ADMIN — Medication SCH ML: at 05:38

## 2019-03-23 RX ADMIN — HYDROMORPHONE HYDROCHLORIDE PRN MG: 2 TABLET ORAL at 01:29

## 2019-03-23 RX ADMIN — FERROUS SULFATE TAB 325 MG (65 MG ELEMENTAL FE) SCH MG: 325 (65 FE) TAB at 09:37

## 2019-03-23 RX ADMIN — NYSTATIN CREAM SCH APPLIC: 100000 CREAM TOPICAL at 09:38

## 2019-03-23 RX ADMIN — ASPIRIN 325 MG ORAL TABLET SCH MG: 325 PILL ORAL at 09:37

## 2019-03-23 RX ADMIN — FONDAPARINUX SODIUM SCH: 2.5 INJECTION, SOLUTION SUBCUTANEOUS at 09:38

## 2019-03-23 RX ADMIN — PIPERACILLIN AND TAZOBACTAM SCH MLS/HR: 3; .375 INJECTION, POWDER, LYOPHILIZED, FOR SOLUTION INTRAVENOUS; PARENTERAL at 05:34

## 2019-03-23 RX ADMIN — BUSPIRONE HYDROCHLORIDE SCH MG: 10 TABLET ORAL at 09:37

## 2019-03-23 RX ADMIN — PIPERACILLIN AND TAZOBACTAM SCH MLS/HR: 3; .375 INJECTION, POWDER, LYOPHILIZED, FOR SOLUTION INTRAVENOUS; PARENTERAL at 11:38

## 2019-03-23 RX ADMIN — HYDROMORPHONE HYDROCHLORIDE PRN MG: 2 TABLET ORAL at 13:41

## 2019-03-23 NOTE — PROGRESS NOTE
Provider Note


Provider Note: 





The patient has been having a fever of 101.7 and a tachycardia 152.  I ordered 2

blood cultures and the patient was given Tylenol after which time she came down 

to 98.9.  I ordered IV Lopressor 5 mg dilated brought heart rate down to 107 at 

which time the temperature was normal.  The patient has been having cough and 

therefore I ordered a chest x-ray that revealed mixed infiltrates concerning for

pneumonia.  Given his prolonged hospitalization and recent infective 

endocarditis, the patient was given IV vancomycin being emergency risk as well 

as IV Zosyn being a Pseudomonas risk.  He was also given mucolytic therapy with 

Mucinex.  We will continue to closely monitor him.

## 2019-03-23 NOTE — PDOC TRANSFER SUMMARY
General


Admission Date/PCP: 


  03/13/19 18:01





  





Resuscitation Status: Full Code





- Transfer Diagnosis


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


Is this a current diagnosis for this admission?: Yes   


Diagnosis Summary: 


03/19/19 17:24


Was supposed to only need 3 more days of Ancef, but it looks like we can still 

see the vegetation on the echocardiogram done here, so vancomycin was added and 

infectious disease was reconsulted.  Her cardiologist recommended that if 

something needs to be done with his heart valve, that we should send him 

somewhere besides violent, because when they looked at them they did not think a

nything else should be done.  It is noted that when he was transferred back 

here, they transferred him with a temperature of 102 F





3/18/2019-we repeated echocardiogram on Friday shows a vegetation on the 

tricuspid valve with severe tricuspid regurgitation.  I spoke to cardiothoracic 

team in [facility] with him and updated about cultures from the PICC line tip is 

positive for gram-negative rods and Candida albicans and blood cultures are 

positive for gram-negative rods and staph epidermidis the recommendation is to 

do the CT and if the cultures are persistently positive and continue to have 

fever they may take him back to [facility] for possible surgery tomorrow.  

Appreciate their concern about the patient.





3/19/2019 plan is to continue the IV antibiotic therapy with vancomycin and 

cefazolin.  As per ID recommendations started on fluconazole 600 mg today on 400

mg tomorrow because of the high suspicion for a blood cultures positive for 

yeast.  T-max is 99.7.  Repeat blood cultures today.





3/20/2019-as per ID recommendations will be going to stop vancomycin and 

cefazolin for now.  Continue fluconazole for at least 2 weeks because the blood 

cultures are positive for Candida.  Patient is still having the low-grade fever.

  planning to do the blood cultures on daily basis.





3/21/2019-patient is off the IV antibiotic therapy as per ID recommendations 

blood cultures are showing candidemia so patient is on fluconazole 400 mg daily 

for 2 weeks from the day of blood cultures negative that is 3/18.  Still having 

the low-grade fever 99.2.  echoCardiogram done last week on 3/15/2019 shows v

isitation on the tricuspid wall associated with severe tricuspid valve 

regurgitation.





3/22/2019-patient is taken off the antibiotic therapy as per Dr. Blackwell's 

recommendations he is presently on fluconazole for Candida albicans that was 

found in the blood cultures.  To continue fluconazole until April 1.  Latest 

blood cultures from 3/18/2019 and 3/19/2019 are negative.  Echocardiogram shows 

vegetation in the tricuspid wall in association with a severe tricuspid valve 

regurgitation.  I spoke to cardiothoracic team in [place] 2 days ago they 

agreed to reevaluate the patient but they do not have any beds available so far.





3/23/2019-patient has a fever of 101.7 last night heart rate went up to 150 he 

was started on IV vancomycin and Zosyn she was given IV metoprolol heart rate 

came down to close to 110 today.  I called vital to this morning and Yana in 

the transfer center told me patient was on the list and when the bed is 

available they will call here.  The nurse upstairs got a call that 

transportation is on the way to take the patient to [place] hopefully he will

be reevaluated again for further management.  Patient is presently on 

fluconazole 400 mg p.o. daily for candidemia and the blood culture and 

vancomycin and Zosyn were restarted yesterday because of high fever and t

achycardia.  As per ID recommendations antibiotics were stopped 2 days ago and 

resumed again last night.








(2) Sepsis


Is this a current diagnosis for this admission?: Yes   


Diagnosis Summary: 


His PICC line was pulled and the tip was cultured, and it is growing out a gram-

negative organism.  This was done because 1 of his blood cultures that was done 

here turned positive for gram-negative organism.  Because he seems to be 

responding at this point to his current treatment, his antibiotics have not been

adjusted, and will wait until his cultures come back before changing his 

regimen, unless infectious disease thinks differently, or unless he 

deteriorates.





3/18/2019-patient is running low at care fever blood cultures are positive for 

gram-negative sounds and staph epidermidis and echocardiogram prior to 

visitation and patient has sepsis.  Presently on cefazolin and vancomycin.  I am

going to put him on Diflucan.








3/19/2019-the cultures from the PICC line on 14th positive for gram-negative 

rods and Candida, PICC line tip cultures on 16 thshows gram-negative rods and 

Candida.  Patient fevers are decided after taking of the PICC line.  Waiting for

the culture report from 18th.  We are going to follow the ID recommendations.  

Echocardiogram was done on Friday shows vegetation on the tricuspid valve with 

severe tricuspid regurgitation.





3/20/2019-the cultures from the PICC line came back gram-negative rods 

Spingomonos and myroids as per ID recommendations antibiotics are discontinued. 

Culture is also showing Candida albicans plan is to continue fluconazole for at 

least 2 weeks. I spoke to cardiothoracic team in the [facility], when the bed is 

available they are going to take him to reevaluate him.





3/21/2019-from the PICC line shows spingomonos  and myroids-as per Dr. Blackwell's 

recommendations IV cefazolin and IV vancomycin are discontinued.  Patient is 

presently on p.o. fluconazole. And is still having the low-grade fever 99.2 

blood pressure is 137/97 sepsis is resolving.





3/22/2019-sepsis is resolving.





3/23/2019-patient has a spiking fever of 101.7 along with tachycardia heart rate

close to 150 started on IV vancomycin and Zosyn he is already on fluconazole for

candidemia and echocardiogram was done last Friday shows vegetation of the 

tricuspid valve with severe tricuspid regurgitation.  Patient is going back to 

[place] today for further evaluation by the cardiothoracic surgeon.  We did a

blood culture again last night.  CT chest was ordered yesterday but the patient 

refused.  CT of the chest done few days ago shows suggests improvement in septic

pulmonary emboli.








(3) IV heroin addiction


Is this a current diagnosis for this admission?: Yes   


Diagnosis Summary: 


3/18 /2019 patient history of IV drug abuse-patient is found to be snorting 

Dilaudid in the hospital room.  Counseling was provided to the patient.  A 

consult was requested.





3/19/2019 patient has history of IV drug abuse 2 days ago he was found to be 

snorting Dilaudid in the hospital room.  We going to  arrange for psych consult.








3/20/2019-patient has history of IV drug abuse psych consult was requested 

patient is refusing to cooperate.





3/21/2019-patient has history of IV heroin drug use psych consult was requested 

patient does not want to speak to the psychiatrist.  psych team unable to give  

any recommendations.





3/22/2019-patient has history of IV drug use and addiction psych consult was 

requested patient is uncooperative and unwilling to discuss the care with the 

psychiatric team.





3/23/2019-patient has history of IV drug abuse and he found to snoring  Dilaudid

tablets in the hospital room.  Psych consult was requested but the patient 

refused to talk to the psychiatric team.








(4) Pneumonia


Is this a current diagnosis for this admission?: Yes   


Diagnosis Summary: 


Latest chest x-ray suggestive of residual versus recurrent cavitary pneumonia 

left lower lobe.  As per cardiothoracic recommendations we are going to do the 

CTA of the chest for further evaluation.  Plan is to continue the present 

management.





3/19/2019-repeat CT scan done yesterday shows no pulmonary abscesses and 

improvement in the appearance of the pulmonary septic emboli.  As per ID 

recommendations plan is to continue the present management.





3/20/2019-repeat CT scan was done on 3/18/2019 he discharges development of 

pseudoaneurysm in the area of septic emboli seen in the prior CT scans.  As 

mentioned above I discussed the report with our cardiothoracic surgeons roby.





3/21/2019-patient has septic pulmonary emboli in the latest CT scan and also 

suggestive pseudoaneurysm in the area of septic pulmonary embolism seen in the 

prior CT scans.  We are going to closely monitor the patient's pulmonary 

function.  The pneumonia most likely secondary to septic emboli.  Most likely 

gram-negative organisms.





3/22/2019-CTA of the chest was done on 3/18/2019 shows significant improvement 

of the multifocal pneumonia and cavitation.  No evidence of pulmonary embolism. 

Right lower lobe pulmonary arterial aneurysm/pseudoaneurysm is noted in the 

region of the previously noted pulmonary embolism.  Patient most likely have 

gram-negative bacteria pneumonia.  Plan is to repeat the CT chest tomorrow.








2/23/2019-patient has pneumonia most likely secondary to endocarditis causing 

septic pulmonary emboli.  Follow-up CT scan shows improvement in the septic 

emboli.  There is right lower lobe pulmonary arterial aneurysm/pseudoaneurysm 

noted in the region of the previously noted pulmonary embolism.  Patient had 

most likely gram-negative bacteria causing hospital-acquired pneumonia.








- Transfer Medications


Home Medications: 


                                        





Quetiapine Fumarate [Seroquel] 50 mg PO Q12 03/13/19 








Transfer Medications: 


                               Current Medications





Acetaminophen (Tylenol 325 Mg Tablet)  650 mg PO Q4HP PRN


   PRN Reason: FOR PAIN OR TEMP


   Stop: 04/15/19 08:23


   Last Admin: 03/22/19 23:25 Dose:  650 mg


   Documented by: 


Aspirin (Aspirin 325 Mg Tablet)  325 mg PO DAILY RULA


   Stop: 04/13/19 09:59


   Last Admin: 03/23/19 09:37 Dose:  325 mg


   Documented by: 


Buspirone HCl (Buspar 10 Mg Tablet)  5 mg PO BID RULA


   Stop: 04/13/19 09:59


   Last Admin: 03/23/19 09:37 Dose:  5 mg


   Documented by: 


Diphenhydramine HCl (Benadryl 25 Mg Capsule)  25 mg PO Q4HP PRN


   PRN Reason: ITCHING


   Stop: 04/21/19 11:17


   Last Admin: 03/22/19 14:27 Dose:  25 mg


   Documented by: 


Ferrous Sulfate (Feosol 325 Mg Tablet)  325 mg PO DAILY RULA


   Stop: 04/13/19 09:59


   Last Admin: 03/23/19 09:37 Dose:  325 mg


   Documented by: 


Fluconazole (Diflucan 100 Mg Tablet)  400 mg PO QPM RULA


   Stop: 03/27/19 17:59


   Last Admin: 03/22/19 17:02 Dose:  400 mg


   Documented by: 


Fondaparinux (Arixtra Inj 2.5 Mg/0.5 Ml Disp.Syrin)  2.5 mg SUBCUT QAM RULA


   Stop: 04/12/19 19:29


   Last Admin: 03/23/19 09:38 Dose:  Not Given


   Documented by: 


Guaifenesin (Mucinex Sr 600 Mg Tablet.Sa)  600 mg PO Q12 RULA


   Stop: 04/22/19 09:59


   Last Admin: 03/23/19 09:37 Dose:  600 mg


   Documented by: 


Hydromorphone HCl (Dilaudid 2 Mg Tablet)  2 mg PO Q4HP PRN


   PRN Reason: PAIN


   Stop: 03/26/19 17:14


   Last Admin: 03/23/19 13:41 Dose:  2 mg


   Documented by: 


Piperacillin Sod/Tazobactam (Sod 3.375 gm/ Sodium Chloride)  100 mls @ 200 ml

s/hr IV Q6 RULA


   Stop: 03/30/19 05:59


   Last Infusion: 03/23/19 12:21 Dose:  Infused


   Documented by: 


Vancomycin HCl 1,000 mg/ (Dextrose)  250 mls @ 166.667 mls/hr IV Q8 RULA


   Stop: 03/30/19 13:59


Metoprolol Tartrate (Lopressor 50 Mg Tablet)  25 mg PO BID RULA


   Stop: 04/15/19 17:59


Nystatin (Mycostatin Cream 15 Gm)  1 applic TP BID [facility]


   Stop: 03/27/19 17:59


   Last Admin: 03/23/19 09:38 Dose:  1 applic


   Documented by: 


Ondansetron HCl (Zofran Inj/Pf 4 Mg/2 Ml Sdv)  4 mg IV Q4HP PRN


   PRN Reason: FOR NAUSEA/VOMITING


   Stop: 04/12/19 18:31


Sodium Chloride (Saline Flush 2.5 Ml Monoject Prefil Syrin)  2.5 ml IV Q8 [facility]


   Stop: 04/13/19 21:59


   Last Admin: 03/23/19 05:38 Dose:  2.5 ml


   Documented by: 











- Allergies


Allergies/Adverse Reactions: 


                                        





No Known Allergies Allergy (Verified 01/08/17 18:49)


   











Physical Exam


Vital Signs: 


                                        











Temp Pulse Resp BP Pulse Ox


 


 98.4 F   92   14   107/48 L  94 


 


 03/23/19 11:44  03/23/19 11:44  03/23/19 11:44  03/23/19 11:44  03/23/19 11:44








                                 Intake & Output











 03/22/19 03/23/19 03/24/19





 06:59 06:59 06:59


 


Intake Total  850 350


 


Balance  850 350


 


Weight 60 kg 60.9 kg 











General appearance: PRESENT: mild distress


Head exam: PRESENT: atraumatic


Eye exam: PRESENT: PERRLA


Mouth exam: PRESENT: moist, tongue midline


Neck exam: ABSENT: carotid bruit, JVD, lymphadenopathy, thyromegaly


Respiratory exam: PRESENT: clear to auscultation tomasa.  ABSENT: rales, rhonchi, 

wheezes


Cardiovascular exam: PRESENT: systolic murmur, tachycardia


GI/Abdominal exam: PRESENT: normal bowel sounds, soft.  ABSENT: distended, 

guarding, mass, organolmegaly, rebound, tenderness


Extremities exam: PRESENT: full ROM.  ABSENT: calf tenderness, clubbing, pedal 

edema


Neurological exam: PRESENT: alert, awake, oriented to person, oriented to place,

 oriented to time, oriented to situation, CN II-XII grossly intact.  ABSENT: mot

or sensory deficit


Psychiatric exam: PRESENT: appropriate affect, normal mood.  ABSENT: homicidal 

ideation, suicidal ideation





Results


Laboratory Results: 


                                        





                                 03/23/19 03:05 





                                 03/23/19 03:05 





                                        











  03/23/19 03/23/19





  03:05 03:05


 


WBC  15.9 H 


 


RBC  3.15 L 


 


Hgb  9.0 L 


 


Hct  26.7 L 


 


MCV  85 


 


MCH  28.6 


 


MCHC  33.7 


 


RDW  16.5 H 


 


Plt Count  197 


 


Seg Neutrophils %  69.3 


 


Lymphocytes %  18.3 


 


Monocytes %  11.8 


 


Eosinophils %  0.2 


 


Basophils %  0.4 


 


Absolute Neutrophils  11.0 H 


 


Absolute Lymphocytes  2.9 


 


Absolute Monocytes  1.9 H 


 


Absolute Eosinophils  0.0 


 


Absolute Basophils  0.1 


 


Sodium   138.3


 


Potassium   3.3 L


 


Chloride   103


 


Carbon Dioxide   23


 


Anion Gap   12


 


BUN   11


 


Creatinine   0.56


 


Est GFR ( Amer)   > 60


 


Est GFR (Non-Af Amer)   > 60


 


Glucose   109


 


Calcium   8.7


 


Magnesium   1.5 L


 


Total Bilirubin   0.8


 


AST   16 L


 


ALT   20 L


 


Alkaline Phosphatase   90


 


Total Protein   6.2 L


 


Albumin   3.1 L











Impressions: 


                                        





Chest/Abdomen CTA  03/18/19 00:00


IMPRESSION:


 


Overall significant improvement in multifocal pneumonia and


cavitation. No evidence for acute pulmonary embolism. Right lower


lobe pulmonary arterial aneurysm/pseudoaneurysms is noted in the


region of previously noted pulmonary embolism.


 








Chest X-Ray  03/22/19 00:00


IMPRESSION:


 


Mixed interstitial and airspace opacity suspicious for pneumonia


 


 


copyright 2011 [employer]- All Rights Reserved


 














Plan


Discharge Plan: 





To transfer the patient to [facility]


Time Spent: Greater than 30 Minutes

## 2019-03-23 NOTE — RADIOLOGY REPORT (SQ)
EXAM DESCRIPTION: 



XR CHEST 1 VIEW



COMPLETED DATE/TME:  03/22/2019 00:00



CLINICAL HISTORY: 



18 years, Male, Tachycardia and fever



COMPARISON:

3/15/2019 chest



NUMBER OF VIEWS:

1



TECHNIQUE:

Portable chest



LIMITATIONS:

None.



FINDINGS:



The heart size is stable. There is been removal of the right PICC

catheter. Mixed interstitial and airspace opacities bilaterally

for which pneumonia is considered. No pneumothorax



IMPRESSION:



Mixed interstitial and airspace opacity suspicious for pneumonia

 



copyright 2011 [employer]- All Rights Reserved

## 2020-08-09 ENCOUNTER — HOSPITAL ENCOUNTER (EMERGENCY)
Dept: HOSPITAL 62 - ER | Age: 20
Discharge: HOME | End: 2020-08-09
Payer: SELF-PAY

## 2020-08-09 VITALS — DIASTOLIC BLOOD PRESSURE: 68 MMHG | SYSTOLIC BLOOD PRESSURE: 107 MMHG
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
## 2020-08-09 DIAGNOSIS — F12.10: ICD-10-CM

## 2020-08-09 DIAGNOSIS — F11.10: ICD-10-CM

## 2020-08-09 DIAGNOSIS — F17.200: ICD-10-CM

## 2020-08-09 DIAGNOSIS — F19.10: ICD-10-CM

## 2020-08-09 DIAGNOSIS — S93.401A: Primary | ICD-10-CM

## 2020-08-09 PROCEDURE — 2W3QX1Z IMMOBILIZATION OF RIGHT LOWER LEG USING SPLINT: ICD-10-PCS | Performed by: EMERGENCY MEDICINE

## 2020-08-09 PROCEDURE — 99283 EMERGENCY DEPT VISIT LOW MDM: CPT

## 2020-08-09 NOTE — ER DOCUMENT REPORT
ED General





- General


Chief Complaint: Ankle Injury


Stated Complaint: FALL,ANKLE INJURY


Time Seen by Provider: 08/09/20 06:01


TRAVEL OUTSIDE OF THE [place] IN LAST 30 DAYS: No





- HPI


Notes: 





Chief complaint: Right ankle injury





History of present illness: 20-year-old male with longstanding history of heroin

and methamphetamine abuse was released from detention within the last 24 hours.  He 

states that he stumbled while getting out of a camper where he was sleeping and 

fell twisting his right ankle.  He denies any head injury or loss of 

consciousness.  He called EMS because of pain in his ankle.  They placed a 

splint and transported him here.  Patient denies drinking alcohol.  He denies 

any use of drugs since he left detention.





- Related Data


Allergies/Adverse Reactions: 


                                        





No Known Allergies Allergy (Verified 01/08/17 18:49)


   











Past Medical History





- General


Information source: Patient





- Social History


Smoking Status: Current Every Day Smoker


Frequency of alcohol use: None


Drug Abuse: Heroin, Marijuana, Methamphetamine


Family History: Hypertension





- Past Medical History


Cardiac Medical History: Reports: Other - History of bacterial endocarditis


Renal/ Medical History: Denies: Hx Peritoneal Dialysis


Psychiatric Medical History: Reports: Hx Depression





Review of Systems





- Review of Systems


Notes: 





Constitutional: Negative for fever.


HENT: Negative for sore throat.


Eyes: Negative for visual changes.


Cardiovascular: Negative for chest pain.


Respiratory: Negative for shortness of breath.


Gastrointestinal: Negative for abdominal pain, vomiting or diarrhea.


Genitourinary: Negative for dysuria.


Musculoskeletal: As per HPI.


Skin: Negative for rash.


Neurological: Negative for headaches, weakness or numbness.





10 point ROS negative except as marked above and in HPI.








Physical Exam





- Vital signs


Vitals: 





                                        











Temp


 


 98.6 F 


 


 08/09/20 05:31














- Notes


Notes: 











GENERAL: Well-developed well-nourished appearing in no acute distress.





SKIN: Good turgor no rashes.  Widespread superficial abrasions.





HEAD: Normocephalic atraumatic.





EYES: PERRLA.  EOMI.  Conjunctivae and sclerae clear.





EARS: CANALS AND TMS CLEAR.





NOSE: CLEAR.





MOUTH: Moist mucosa.  Good dentition.  No stridor or edema.  No drooling.





NECK: Supple.  No masses or thyromegaly.  No adenopathy.  Carotids 2+ without 

bruits.  No JVD.





BACK: Symmetrical without tenderness.





CHEST: Respirations unlabored.  Breath sounds clear and symmetrical.





HEART: Regular rhythm.  No murmur gallop or rub.





ABDOMEN: Soft nontender without masses, organomegaly or rebound.  Bowel sounds 

normally active.  No bruits.





GENITALIA: Deferred.





EXTREMITIES: Mild soft tissue swelling and mild tenderness over medial and 

lateral aspect of right ankle.  Achilles tendon is normal to palpation.  

Rodriguez test normal.  No gross ligamentous instability.  No edema.  No calf 

tenderness.  Cap refill less than 1.5 seconds.  Dorsalis pedis and posterior 

tibial pulses 3+ and symmetrical.





NEUROLOGICAL: GCS 15.  Intermittent scattered random twitching movements.  

Patient says he has this all the time due to prior abuse of amphetamines.  Alert

and oriented x3.  Mildly slurred speech.  Cranial nerves II through XII intact. 

Sensorimotor and cerebellar normal.  Normal tone.





PSYCHIATRIC: Mildly argumentative with otherwise appropriate affect.





Course





- Re-evaluation


Re-evalutation: 





08/09/20 07:07


X-ray of the right ankle was negative for fracture dislocation per radiologist. 

Patient refused blood draw for blood alcohol and urine testing.  He is oriented 

at this point appears to have capacity to decline further testing.  He has a 

friend who is going to take him home.  We will place an ankle stirrup and allow 

him to be discharged





- Vital Signs


Vital signs: 





                                        











Temp Pulse Resp BP Pulse Ox


 


 98.6 F      30 H  107/72   97 


 


 08/09/20 06:00     08/09/20 06:00  08/09/20 05:44  08/09/20 06:00














- Diagnostic Test


Radiology reviewed: Reports reviewed - Negative right ankle per radiologist.





Procedures





- Immobilization


  ** Right Ankle


Time completed: 07:08


Pre-Proc Neuro Vasc Exam: Normal


Immobilizer type: Ankle stirrup


Performed by: PCT


Post-Proc Neuro Vasc Exam: Normal





Discharge





- Discharge


Clinical Impression: 


Sprain of right ankle


Qualifiers:


 Encounter type: initial encounter Involved ligament of ankle: unspecified 

ligament Qualified Code(s): S93.401A - Sprain of unspecified ligament of right 

ankle, initial encounter





Condition: Stable


Disposition: HOME, SELF-CARE


Instructions:  Ankle Stirrup Splint ([facility]), Ice & Elevation ([facility])


Additional Instructions: 


Tylenol as needed.  Follow-up with your primary care doctor.


Referrals: 


[facility] [Provider Group] - Follow up as needed

## 2020-08-09 NOTE — RADIOLOGY REPORT (SQ)
EXAM DESCRIPTION: 



XR ANKLE 3 OR MORE VIEWS



COMPLETED DATE/TME:  08/09/2020 05:36



CLINICAL HISTORY: 



20 years, Male, missed a stepped walking out of camper



COMPARISON:

None.



NUMBER OF VIEWS:

3



TECHNIQUE:

3 views right ankle



LIMITATIONS:

None.



FINDINGS:



Negative for acute fracture or dislocation. The ankle mortise is

intact. Soft tissues are unremarkable



IMPRESSION:



Negative exam

 



copyright 2011 [employer]- All Rights Reserved

## 2020-09-22 ENCOUNTER — HOSPITAL ENCOUNTER (EMERGENCY)
Dept: HOSPITAL 62 - ER | Age: 20
LOS: 1 days | Discharge: HOME | End: 2020-09-23
Payer: SELF-PAY

## 2020-09-22 DIAGNOSIS — L02.213: Primary | ICD-10-CM

## 2020-09-22 DIAGNOSIS — F17.210: ICD-10-CM

## 2020-09-22 DIAGNOSIS — F11.10: ICD-10-CM

## 2020-09-22 DIAGNOSIS — R00.0: ICD-10-CM

## 2020-09-22 LAB
ADD MANUAL DIFF: NO
ALBUMIN SERPL-MCNC: 3.6 G/DL (ref 3.5–5)
ALP SERPL-CCNC: 116 U/L (ref 38–126)
ANION GAP SERPL CALC-SCNC: 11 MMOL/L (ref 5–19)
APPEARANCE UR: CLEAR
APTT PPP: (no result) S
AST SERPL-CCNC: 16 U/L (ref 17–59)
BARBITURATES UR QL SCN: NEGATIVE
BASOPHILS # BLD AUTO: 0.1 10^3/UL (ref 0–0.2)
BASOPHILS NFR BLD AUTO: 0.7 % (ref 0–2)
BILIRUB DIRECT SERPL-MCNC: 0.4 MG/DL (ref 0–0.4)
BILIRUB SERPL-MCNC: 0.4 MG/DL (ref 0.2–1.3)
BILIRUB UR QL STRIP: NEGATIVE
BUN SERPL-MCNC: 7 MG/DL (ref 7–20)
CALCIUM: 9.1 MG/DL (ref 8.4–10.2)
CHLORIDE SERPL-SCNC: 93 MMOL/L (ref 98–107)
CO2 SERPL-SCNC: 31 MMOL/L (ref 22–30)
EOSINOPHIL # BLD AUTO: 0.2 10^3/UL (ref 0–0.6)
EOSINOPHIL NFR BLD AUTO: 1 % (ref 0–6)
ERYTHROCYTE [DISTWIDTH] IN BLOOD BY AUTOMATED COUNT: 15.1 % (ref 11.5–14)
GLUCOSE SERPL-MCNC: 111 MG/DL (ref 75–110)
GLUCOSE UR STRIP-MCNC: NEGATIVE MG/DL
HCT VFR BLD CALC: 36.7 % (ref 37.9–51)
HGB BLD-MCNC: 12.5 G/DL (ref 13.5–17)
KETONES UR STRIP-MCNC: NEGATIVE MG/DL
LYMPHOCYTES # BLD AUTO: 3.5 10^3/UL (ref 0.5–4.7)
LYMPHOCYTES NFR BLD AUTO: 18.1 % (ref 13–45)
MCH RBC QN AUTO: 28.3 PG (ref 27–33.4)
MCHC RBC AUTO-ENTMCNC: 34.2 G/DL (ref 32–36)
MCV RBC AUTO: 83 FL (ref 80–97)
METHADONE UR QL SCN: NEGATIVE
MONOCYTES # BLD AUTO: 2.1 10^3/UL (ref 0.1–1.4)
MONOCYTES NFR BLD AUTO: 10.9 % (ref 3–13)
NEUTROPHILS # BLD AUTO: 13.5 10^3/UL (ref 1.7–8.2)
NEUTS SEG NFR BLD AUTO: 69.3 % (ref 42–78)
NITRITE UR QL STRIP: NEGATIVE
PCP UR QL SCN: NEGATIVE
PH UR STRIP: 6 [PH] (ref 5–9)
PLATELET # BLD: 439 10^3/UL (ref 150–450)
POTASSIUM SERPL-SCNC: 3.5 MMOL/L (ref 3.6–5)
PROT SERPL-MCNC: 7 G/DL (ref 6.3–8.2)
PROT UR STRIP-MCNC: NEGATIVE MG/DL
RBC # BLD AUTO: 4.43 10^6/UL (ref 4.35–5.55)
SP GR UR STRIP: 1.02
TOTAL CELLS COUNTED % (AUTO): 100 %
URINE AMPHETAMINES SCREEN: NEGATIVE
URINE BENZODIAZEPINES SCREEN: NEGATIVE
URINE COCAINE SCREEN: NEGATIVE
URINE MARIJUANA (THC) SCREEN: (no result)
UROBILINOGEN UR-MCNC: 4 MG/DL (ref ?–2)
WBC # BLD AUTO: 19.5 10^3/UL (ref 4–10.5)

## 2020-09-22 PROCEDURE — 93005 ELECTROCARDIOGRAM TRACING: CPT

## 2020-09-22 PROCEDURE — 87077 CULTURE AEROBIC IDENTIFY: CPT

## 2020-09-22 PROCEDURE — 87205 SMEAR GRAM STAIN: CPT

## 2020-09-22 PROCEDURE — 96365 THER/PROPH/DIAG IV INF INIT: CPT

## 2020-09-22 PROCEDURE — 99152 MOD SED SAME PHYS/QHP 5/>YRS: CPT

## 2020-09-22 PROCEDURE — 96367 TX/PROPH/DG ADDL SEQ IV INF: CPT

## 2020-09-22 PROCEDURE — 87070 CULTURE OTHR SPECIMN AEROBIC: CPT

## 2020-09-22 PROCEDURE — 36415 COLL VENOUS BLD VENIPUNCTURE: CPT

## 2020-09-22 PROCEDURE — 81001 URINALYSIS AUTO W/SCOPE: CPT

## 2020-09-22 PROCEDURE — 80053 COMPREHEN METABOLIC PANEL: CPT

## 2020-09-22 PROCEDURE — 83605 ASSAY OF LACTIC ACID: CPT

## 2020-09-22 PROCEDURE — 93010 ELECTROCARDIOGRAM REPORT: CPT

## 2020-09-22 PROCEDURE — 87040 BLOOD CULTURE FOR BACTERIA: CPT

## 2020-09-22 PROCEDURE — 80307 DRUG TEST PRSMV CHEM ANLYZR: CPT

## 2020-09-22 PROCEDURE — 85025 COMPLETE CBC W/AUTO DIFF WBC: CPT

## 2020-09-22 PROCEDURE — 10060 I&D ABSCESS SIMPLE/SINGLE: CPT

## 2020-09-22 PROCEDURE — 99285 EMERGENCY DEPT VISIT HI MDM: CPT

## 2020-09-22 NOTE — ER DOCUMENT REPORT
ED Medical Screen (RME)





- General


Chief Complaint: Abscess


Stated Complaint: RIGHT ARMPIT SWOLLEN


Time Seen by Provider: 09/22/20 21:37


Mode of Arrival: Ambulatory


Information source: Patient


Notes: 





20-year-old to ED for a very large abscess to the right sella.  He states it 

started as a little bump 6 days ago and now it is a very large abscess.  He 

states he was seen recently for endocarditis and shipped out to [facility].  He states he does smoke a pack a day does not drink and snorts heroin 

last time was yesterday.  He states he does not use any injectable drugs.














I have greeted and performed a rapid initial assessment of this patient.  A 

comprehensive ED assessment and evaluation of the patient, analysis of test 

results and completion of medical decision making process will be conducted by 

an additional ED providers.


TRAVEL OUTSIDE OF THE [place] IN LAST 30 DAYS: No





- Related Data


Allergies/Adverse Reactions: 


                                        





No Known Allergies Allergy (Verified 01/08/17 18:49)


   











Past Medical History





- Social History


Chew tobacco use (# tins/day): No


Frequency of alcohol use: Occasional


Drug Abuse: Heroin


Renal/ Medical History: Denies: Hx Peritoneal Dialysis


Psychiatric Medical History: Reports: Hx Depression





Physical Exam





- Vital signs


Vitals: 





                                        











Temp Pulse Resp BP Pulse Ox


 


 99.0 F   106 H  20   110/74   100 


 


 09/22/20 20:50  09/22/20 20:50  09/22/20 20:50  09/22/20 20:50  09/22/20 20:50














Course





- Vital Signs


Vital signs: 





                                        











Temp Pulse Resp BP Pulse Ox


 


 99.0 F   106 H  20   110/74   100 


 


 09/22/20 20:50  09/22/20 20:50  09/22/20 20:50  09/22/20 20:50  09/22/20 20:50

## 2020-09-23 VITALS — DIASTOLIC BLOOD PRESSURE: 58 MMHG | SYSTOLIC BLOOD PRESSURE: 108 MMHG

## 2020-09-23 NOTE — ER DOCUMENT REPORT
Entered by STEVE SULLIVAN SCRIBE  09/22/20 0115 





Acting as scribe for:CINDY GONZALEZ DO





ED General





- General


Chief Complaint: Abscess


Stated Complaint: RIGHT ARMPIT SWOLLEN


Time Seen by Provider: 09/22/20 21:37


Mode of Arrival: Ambulatory


Information source: Patient


Notes: 





This 20 year old male patient presents to the emergency department today with 

complaints of a abscess to his right axillary region. Patient states he noticed 

the abscess x6 days ago and it began to drain while waiting in triage in the ED.

Denies any fever or other abscesses. Patient reports history of heroin use. 

Patient states the last time he injected heroin was x1-2 months ago, and last 

snorted heroin a few days ago.


TRAVEL OUTSIDE OF THE [place] IN LAST 30 DAYS: No





- Related Data


Allergies/Adverse Reactions: 


                                        





No Known Allergies Allergy (Verified 01/08/17 18:49)


   











Past Medical History





- General


Information source: Patient





- Social History


Smoking Status: Current Every Day Smoker


Cigarette use (# per day): Yes


Chew tobacco use (# tins/day): No


Frequency of alcohol use: Occasional


Drug Abuse: Heroin


Lives with: Family


Family History: Reviewed & Not Pertinent, Hypertension


Patient has homicidal ideation: No





- Past Medical History


Cardiac Medical History: Reports: Hx Pulmonary Embolism


Pulmonary Medical History: Reports: Hx Pneumonia


Renal/ Medical History: Denies: Hx Peritoneal Dialysis


Psychiatric Medical History: Reports: Hx Depression





Review of Systems





- Review of Systems


Constitutional: See HPI.  denies: Fever


EENT: No symptoms reported


Cardiovascular: No symptoms reported


Respiratory: No symptoms reported


Gastrointestinal: No symptoms reported


Genitourinary: No symptoms reported


Male Genitourinary: No symptoms reported


Musculoskeletal: No symptoms reported


Skin: See HPI, Other - Abscess


Hematologic/Lymphatic: No symptoms reported


Neurological/Psychological: No symptoms reported


-: Yes All other systems reviewed and negative





Physical Exam





- Vital signs


Vitals: 


                                        











Temp Pulse Resp BP Pulse Ox


 


 99.0 F   106 H  20   110/74   100 


 


 09/22/20 20:50  09/22/20 20:50  09/22/20 20:50  09/22/20 20:50  09/22/20 20:50














- General


Notes: 


Alert. Appears chronically ill and borderline malnourished. 





- HEENT


Head: Normocephalic, Atraumatic


Eyes: Normal


Pupils: PERRL





- Respiratory


Respiratory status: No respiratory distress


Chest status: Nontender


Breath sounds: Normal





- Cardiovascular


Rhythm: Regular


Heart sounds: Normal auscultation


Murmur: No





- Abdominal


Inspection: Normal


Distension: No distension


Bowel sounds: Normal


Tenderness: Nontender





- Extremities


General upper extremity: Normal inspection, Normal ROM


General lower extremity: Normal inspection, Normal ROM.  No: Edema





- Neurological


Neuro grossly intact: Yes


Cognition: Normal


Orientation: AAOx4


Casandra Coma Scale Eye Opening: Spontaneous


Russell Coma Scale Verbal: Oriented


Casandra Coma Scale Motor: Obeys Commands


Russell Coma Scale Total: 15


Speech: Normal


Sensory: Normal





- Psychological


Associated symptoms: Normal affect, Normal mood





- Skin


Skin Temperature: Warm


Skin Moisture: Dry





Course





- Re-evaluation


Re-evalutation: 





09/23/20 01:21


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 

heroin and has had a abcess that developed in right axilla region.  Began 

draining in waiting room.  Abcess was fully drained here after conscious 

sedation and he tolerated this well.  We discussed follow up and he expressed 

understanding.








Conscious sedation note


I was present in and directly involved in conscious sedation with total sedation

time of 10 minutes.   





- Vital Signs


Vital signs: 


                                        











Temp Pulse Resp BP Pulse Ox


 


 99.0 F   109 H  24 H  121/73   100 


 


 09/22/20 20:50  09/23/20 01:24  09/23/20 01:24  09/23/20 01:24  09/23/20 01:24














- Laboratory


Result Diagrams: 


                                 09/22/20 22:18





                                 09/22/20 22:18


Laboratory results interpreted by me: 


                                        











  09/22/20 09/22/20 09/22/20





  20:57 22:18 22:18


 


WBC   19.5 H 


 


Hgb   12.5 L 


 


Hct   36.7 L 


 


RDW   15.1 H 


 


Absolute Neuts (auto)   13.5 H 


 


Absolute Monos (auto)   2.1 H 


 


Sodium    134.5 L


 


Potassium    3.5 L


 


Chloride    93 L


 


Carbon Dioxide    31 H


 


Glucose    111 H


 


AST    16 L


 


Urine Urobilinogen  4.0 H  


 


Urine Ascorbic Acid  20 H  














- EKG Interpretation by Me


EKG shows normal: Sinus rhythm


Rate: Tachycardia


Rhythm: NSR - Sinus Tachy Nl axis repolarization abnormality without st 

elevaiton or depression my interpretation.





Procedures





- Incision and Drainage


  ** Right Chest


Time completed: 01:00


Type: Complex


Blade size: 11


Incision Method: Incision made by scalpel


Amount/type of drainage: 20 ml pus


Notes: 





09/23/20 01:16


After conscious sedation - 15 mg etomidate IV which was given by nursing staff 

after time out - a large right chest wall abcess was incised with #11 scalpel 

and loculations were broken with forceps.  The wound was irrigated with sterile 

water copiously and packing was placed.  The pt tolerated the procedure well 

with no apparent complications. 





Discharge





- Discharge


Clinical Impression: 


 Abscess, History of conscious sedation





Condition: Stable


Disposition: HOME, SELF-CARE


Instructions:  Abscess (OM), Post Incision and Drainage, Post Sedation 

Instructions ([facility]), Trimethoprim-Sulfa ([facility])


Additional Instructions: 


Stop using heroin or it may kill you.  Take the antibiotics as directed.  Return

here on Friday for a recheck.  Return sooner for fever increased pain or other 

concerns.  





Start the antibiotic 9/24.





Take tylenol for pain. 


Prescriptions: 


Sulfamethoxazole/Trimethoprim [Bactrim Ds Tablet] 1 each PO BID #28 tablet





I personally performed the services described in the documentation, reviewed and

edited the documentation which was dictated to the scribe in my presence, and it

accurately records my words and actions.

## 2020-09-23 NOTE — EKG REPORT
SEVERITY:- ABNORMAL ECG -

SINUS TACHYCARDIA

PROBABLE RIGHT VENTRICULAR HYPERTROPHY

:

Confirmed by: Sarah Rayo MD 23-Sep-2020 01:37:43